# Patient Record
Sex: FEMALE | Race: WHITE | NOT HISPANIC OR LATINO | Employment: OTHER | URBAN - METROPOLITAN AREA
[De-identification: names, ages, dates, MRNs, and addresses within clinical notes are randomized per-mention and may not be internally consistent; named-entity substitution may affect disease eponyms.]

---

## 2017-09-25 ENCOUNTER — HOSPITAL ENCOUNTER (OUTPATIENT)
Dept: PREADMISSION TESTING | Facility: OTHER | Age: 62
Discharge: HOME | End: 2017-09-25
Attending: ORTHOPAEDIC SURGERY
Payer: COMMERCIAL

## 2017-09-25 ENCOUNTER — ANESTHESIA EVENT (OUTPATIENT)
Dept: SURGERY | Facility: OTHER | Age: 62
DRG: 470 | End: 2017-09-25
Payer: COMMERCIAL

## 2017-09-25 VITALS
DIASTOLIC BLOOD PRESSURE: 89 MMHG | TEMPERATURE: 98 F | SYSTOLIC BLOOD PRESSURE: 149 MMHG | WEIGHT: 260 LBS | HEART RATE: 81 BPM | HEIGHT: 69 IN | OXYGEN SATURATION: 94 % | BODY MASS INDEX: 38.51 KG/M2

## 2017-09-25 DIAGNOSIS — Z01.818 PREOPERATIVE TESTING: ICD-10-CM

## 2017-09-25 DIAGNOSIS — M17.12 PRIMARY OSTEOARTHRITIS OF LEFT KNEE: Primary | ICD-10-CM

## 2017-09-25 LAB
ABO + RH BLD: NORMAL
ANION GAP SERPL CALC-SCNC: 10 MMOL/L
BASOPHILS # BLD AUTO: 0.05 K/UL
BASOPHILS NFR BLD: 0.8 %
BILIRUB UR QL STRIP: NEGATIVE
BLD GP AB SCN CELLS X3 SERPL QL: NORMAL
BUN SERPL-MCNC: 12 MG/DL
CALCIUM SERPL-MCNC: 10.6 MG/DL
CHLORIDE SERPL-SCNC: 105 MMOL/L
CLARITY UR: CLEAR
CO2 SERPL-SCNC: 24 MMOL/L
COLOR UR: YELLOW
CREAT SERPL-MCNC: 0.7 MG/DL
DIFFERENTIAL METHOD: ABNORMAL
EOSINOPHIL # BLD AUTO: 0.3 K/UL
EOSINOPHIL NFR BLD: 3.9 %
ERYTHROCYTE [DISTWIDTH] IN BLOOD BY AUTOMATED COUNT: 14.6 %
EST. GFR  (AFRICAN AMERICAN): >60 ML/MIN/1.73 M^2
EST. GFR  (NON AFRICAN AMERICAN): >60 ML/MIN/1.73 M^2
GLUCOSE SERPL-MCNC: 99 MG/DL
GLUCOSE UR QL STRIP: NEGATIVE
HCT VFR BLD AUTO: 44.8 %
HGB BLD-MCNC: 14.8 G/DL
HGB UR QL STRIP: NEGATIVE
KETONES UR QL STRIP: NEGATIVE
LEUKOCYTE ESTERASE UR QL STRIP: NEGATIVE
LYMPHOCYTES # BLD AUTO: 1.3 K/UL
LYMPHOCYTES NFR BLD: 20.5 %
MCH RBC QN AUTO: 29.4 PG
MCHC RBC AUTO-ENTMCNC: 33 G/DL
MCV RBC AUTO: 89 FL
MONOCYTES # BLD AUTO: 0.6 K/UL
MONOCYTES NFR BLD: 9.2 %
NEUTROPHILS # BLD AUTO: 4.2 K/UL
NEUTROPHILS NFR BLD: 64.8 %
NITRITE UR QL STRIP: NEGATIVE
PH UR STRIP: 7 [PH] (ref 5–8)
PLATELET # BLD AUTO: 248 K/UL
PMV BLD AUTO: 11.5 FL
POTASSIUM SERPL-SCNC: 4.3 MMOL/L
PROT UR QL STRIP: NEGATIVE
RBC # BLD AUTO: 5.03 M/UL
SODIUM SERPL-SCNC: 139 MMOL/L
SP GR UR STRIP: 1.01 (ref 1–1.03)
URN SPEC COLLECT METH UR: NORMAL
UROBILINOGEN UR STRIP-ACNC: NEGATIVE EU/DL
WBC # BLD AUTO: 6.49 K/UL

## 2017-09-25 PROCEDURE — 36415 COLL VENOUS BLD VENIPUNCTURE: CPT

## 2017-09-25 PROCEDURE — 85025 COMPLETE CBC W/AUTO DIFF WBC: CPT

## 2017-09-25 PROCEDURE — 81003 URINALYSIS AUTO W/O SCOPE: CPT

## 2017-09-25 PROCEDURE — 86850 RBC ANTIBODY SCREEN: CPT

## 2017-09-25 PROCEDURE — 93005 ELECTROCARDIOGRAM TRACING: CPT

## 2017-09-25 PROCEDURE — 86900 BLOOD TYPING SEROLOGIC ABO: CPT

## 2017-09-25 PROCEDURE — 80048 BASIC METABOLIC PNL TOTAL CA: CPT

## 2017-09-25 PROCEDURE — 93010 ELECTROCARDIOGRAM REPORT: CPT | Mod: ,,, | Performed by: INTERNAL MEDICINE

## 2017-09-25 RX ORDER — LOSARTAN POTASSIUM AND HYDROCHLOROTHIAZIDE 12.5; 5 MG/1; MG/1
1 TABLET ORAL DAILY
COMMUNITY

## 2017-09-25 RX ORDER — ZOLPIDEM TARTRATE 10 MG/1
10 TABLET ORAL NIGHTLY PRN
COMMUNITY
End: 2020-01-17 | Stop reason: CLARIF

## 2017-09-25 RX ORDER — FAMOTIDINE 20 MG/1
20 TABLET, FILM COATED ORAL
Status: CANCELLED | OUTPATIENT
Start: 2017-09-25 | End: 2017-09-25

## 2017-09-25 RX ORDER — SODIUM CHLORIDE, SODIUM LACTATE, POTASSIUM CHLORIDE, CALCIUM CHLORIDE 600; 310; 30; 20 MG/100ML; MG/100ML; MG/100ML; MG/100ML
INJECTION, SOLUTION INTRAVENOUS CONTINUOUS
Status: CANCELLED | OUTPATIENT
Start: 2017-09-25

## 2017-09-25 RX ORDER — LIDOCAINE HYDROCHLORIDE 10 MG/ML
1 INJECTION, SOLUTION EPIDURAL; INFILTRATION; INTRACAUDAL; PERINEURAL ONCE
Status: CANCELLED | OUTPATIENT
Start: 2017-09-25 | End: 2017-09-25

## 2017-09-25 RX ORDER — IBUPROFEN 100 MG/5ML
1000 SUSPENSION, ORAL (FINAL DOSE FORM) ORAL DAILY
COMMUNITY

## 2017-09-25 RX ORDER — EPINEPHRINE 0.22MG
100 AEROSOL WITH ADAPTER (ML) INHALATION DAILY
COMMUNITY
End: 2020-01-17 | Stop reason: CLARIF

## 2017-09-25 RX ORDER — MIDAZOLAM HYDROCHLORIDE 5 MG/ML
4 INJECTION INTRAMUSCULAR; INTRAVENOUS
Status: CANCELLED | OUTPATIENT
Start: 2017-09-25

## 2017-09-25 RX ORDER — OXYCODONE HYDROCHLORIDE 5 MG/1
5 TABLET ORAL ONCE AS NEEDED
Status: CANCELLED | OUTPATIENT
Start: 2017-09-25 | End: 2017-09-25

## 2017-09-25 RX ORDER — BRIMONIDINE TARTRATE 1.5 MG/ML
1 SOLUTION/ DROPS OPHTHALMIC 2 TIMES DAILY
COMMUNITY

## 2017-09-25 NOTE — DISCHARGE INSTRUCTIONS
PRE-ADMIT TESTING -  [phone]    [address]  [place] [facility]          Your surgery has been scheduled at [facility]. We are pleased to have the opportunity to serve you. For Further Information please call [phone].    On the day of surgery please report to the Information Desk on the 1st floor.    · CONTACT YOUR PHYSICIAN'S OFFICE THE DAY PRIOR TO YOUR SURGERY TO OBTAIN YOUR ARRIVAL TIME.     · The evening before surgery do not eat anything after 9 p.m. ( this includes hard candy, chewing gum and mints).  You may only have GATORADE, POWERADE AND WATER  from 9 p.m. until you leave your home.   DO NOT DRINK ANY LIQUIDS ON THE WAY TO THE HOSPITAL.      SPECIAL MEDICATION INSTRUCTIONS: TAKE medications checked off by the Anesthesiologist on your Medication List.    Angiogram Patients: Take medications as instructed by your physician, including aspirin.     Surgery Patients:    If you take ASPIRIN - Your PHYSICIAN/SURGEON will need to inform you IF/OR when you need to stop taking aspirin prior to your surgery.     Do Not take any medications containing IBUPROFEN.  Do Not Wear any make-up or dark nail polish   (especially eye make-up) to surgery. If you come to surgery with makeup on you will be required to remove the makeup or nail polish.    Do not shave your surgical area at least 5 days prior to your surgery. The surgical prep will be performed at the hospital according to Infection Control regulations.    Leave all valuables at home.   Do Not wear any jewelry or watches, including any metal in body piercings.  Contact Lens must be removed before surgery. Either do not wear the contact lens or bring a case and solution for storage.  Please bring a container for eyeglasses or dentures as required.  Bring any paperwork your physician has provided, such as consent forms,  history and physicals, doctor's orders, etc.   Bring comfortable clothes that are loose fitting to wear upon  discharge. Take into consideration the type of surgery being performed.  Maintain your diet as advised per your physician the day prior to surgery.      Adequate rest the night before surgery is advised.   Park in the Parking lot behind the hospital or in the [facility] across the street from the parking lot. Parking is complimentary.  If you will be discharged the same day as your procedure, please arrange for a responsible adult to drive you home or to accompany you if traveling by taxi.   YOU WILL NOT BE PERMITTED TO DRIVE OR TO LEAVE THE HOSPITAL ALONE AFTER SURGERY.   It is strongly recommended that you arrange for someone to remain with you for the first 24 hrs following your surgery.       Thank you for your cooperation.  The Staff of [facility].        Bathing Instructions                                                                 Please shower the evening before and morning of your procedure with    ANTIBACTERIAL SOAP. ( DIAL, etc )  Concentrate on the surgical area   for at least 3 minutes and rinse completely. Dry off as usual.   Do not use any deodorant, powder, body lotions, perfume, after shave or    cologne.

## 2017-09-25 NOTE — ANESTHESIA PREPROCEDURE EVALUATION
09/25/2017  Ana Maria Daniels is a 62 y.o., female.    Anesthesia Evaluation    I have reviewed the Patient Summary Reports.    I have reviewed the Nursing Notes.   I have reviewed the Medications.     Review of Systems  Social:  Non-Smoker, Social Alcohol Use    Hematology/Oncology:  Hematology Normal   Oncology Normal     EENT/Dental:EENT/Dental Normal   Cardiovascular:   Exercise tolerance: good Hypertension, well controlled    Pulmonary:  Pulmonary Normal    Renal/:  Renal/ Normal     Hepatic/GI:  Hepatic/GI Normal    Musculoskeletal:   Arthritis     Neurological:  Neurology Normal    Endocrine:  Endocrine Normal    Dermatological:  Skin Normal    Psych:  Psychiatric Normal           Physical Exam  General:  Morbid Obesity    Airway/Jaw/Neck:  Airway Findings: Mouth Opening: Normal Tongue: Normal  General Airway Assessment: Adult, Good  Mallampati: II  TM Distance: Normal, at least 6 cm  Jaw/Neck Findings:  Neck ROM: Normal ROM      Dental:  Dental Findings: In tact        Mental Status:  Mental Status Findings:  Cooperative, Alert and Oriented         Anesthesia Plan  Type of Anesthesia, risks & benefits discussed:  Anesthesia Type:  spinal  Patient's Preference: Spinal.  Intra-op Monitoring Plan:   Intra-op Monitoring Plan Comments:   Post Op Pain Control Plan:   Post Op Pain Control Plan Comments:   Induction:    Beta Blocker:         Informed Consent: Patient understands risks and agrees with Anesthesia plan.  Questions answered. Anesthesia consent signed with patient.  ASA Score: 3     Day of Surgery Review of History & Physical:    H&P update referred to the surgeon.     Anesthesia Plan Notes: Labs and EkG ordered. Clearance pending.          Ready For Surgery From Anesthesia Perspective.

## 2017-10-03 ENCOUNTER — HOSPITAL ENCOUNTER (INPATIENT)
Facility: OTHER | Age: 62
LOS: 1 days | Discharge: HOME HEALTH | DRG: 470 | End: 2017-10-04
Attending: ORTHOPAEDIC SURGERY | Admitting: ORTHOPAEDIC SURGERY
Payer: COMMERCIAL

## 2017-10-03 ENCOUNTER — ANESTHESIA (OUTPATIENT)
Dept: SURGERY | Facility: OTHER | Age: 62
DRG: 470 | End: 2017-10-03
Payer: COMMERCIAL

## 2017-10-03 DIAGNOSIS — M17.12 PRIMARY LOCALIZED OSTEOARTHRITIS OF LEFT KNEE: ICD-10-CM

## 2017-10-03 DIAGNOSIS — M17.12 LOCALIZED OSTEOARTHRITIS OF LEFT KNEE: Primary | ICD-10-CM

## 2017-10-03 PROCEDURE — 63600175 PHARM REV CODE 636 W HCPCS: Performed by: ORTHOPAEDIC SURGERY

## 2017-10-03 PROCEDURE — 25000003 PHARM REV CODE 250: Performed by: SPECIALIST

## 2017-10-03 PROCEDURE — C1776 JOINT DEVICE (IMPLANTABLE): HCPCS | Performed by: ORTHOPAEDIC SURGERY

## 2017-10-03 PROCEDURE — 25000003 PHARM REV CODE 250: Performed by: INTERNAL MEDICINE

## 2017-10-03 PROCEDURE — 25000003 PHARM REV CODE 250: Performed by: ANESTHESIOLOGY

## 2017-10-03 PROCEDURE — 63600175 PHARM REV CODE 636 W HCPCS: Performed by: SPECIALIST

## 2017-10-03 PROCEDURE — 37000009 HC ANESTHESIA EA ADD 15 MINS: Performed by: ORTHOPAEDIC SURGERY

## 2017-10-03 PROCEDURE — 36000711: Performed by: ORTHOPAEDIC SURGERY

## 2017-10-03 PROCEDURE — 94761 N-INVAS EAR/PLS OXIMETRY MLT: CPT

## 2017-10-03 PROCEDURE — 71000039 HC RECOVERY, EACH ADD'L HOUR: Performed by: ORTHOPAEDIC SURGERY

## 2017-10-03 PROCEDURE — 0SRD0J9 REPLACEMENT OF LEFT KNEE JOINT WITH SYNTHETIC SUBSTITUTE, CEMENTED, OPEN APPROACH: ICD-10-PCS | Performed by: ORTHOPAEDIC SURGERY

## 2017-10-03 PROCEDURE — C9290 INJ, BUPIVACAINE LIPOSOME: HCPCS | Performed by: ORTHOPAEDIC SURGERY

## 2017-10-03 PROCEDURE — 36000710: Performed by: ORTHOPAEDIC SURGERY

## 2017-10-03 PROCEDURE — 25000003 PHARM REV CODE 250: Performed by: ORTHOPAEDIC SURGERY

## 2017-10-03 PROCEDURE — 27201423 OPTIME MED/SURG SUP & DEVICES STERILE SUPPLY: Performed by: ORTHOPAEDIC SURGERY

## 2017-10-03 PROCEDURE — 37000008 HC ANESTHESIA 1ST 15 MINUTES: Performed by: ORTHOPAEDIC SURGERY

## 2017-10-03 PROCEDURE — 71000033 HC RECOVERY, INTIAL HOUR: Performed by: ORTHOPAEDIC SURGERY

## 2017-10-03 PROCEDURE — 25000003 PHARM REV CODE 250: Performed by: NURSE ANESTHETIST, CERTIFIED REGISTERED

## 2017-10-03 PROCEDURE — 11000001 HC ACUTE MED/SURG PRIVATE ROOM

## 2017-10-03 PROCEDURE — C1713 ANCHOR/SCREW BN/BN,TIS/BN: HCPCS | Performed by: ORTHOPAEDIC SURGERY

## 2017-10-03 PROCEDURE — 97116 GAIT TRAINING THERAPY: CPT

## 2017-10-03 PROCEDURE — 63600175 PHARM REV CODE 636 W HCPCS: Performed by: NURSE ANESTHETIST, CERTIFIED REGISTERED

## 2017-10-03 PROCEDURE — 94799 UNLISTED PULMONARY SVC/PX: CPT

## 2017-10-03 PROCEDURE — 97161 PT EVAL LOW COMPLEX 20 MIN: CPT

## 2017-10-03 PROCEDURE — 97110 THERAPEUTIC EXERCISES: CPT

## 2017-10-03 DEVICE — CEMENT BONE RDPQ 40G PDR 20ML: Type: IMPLANTABLE DEVICE | Site: KNEE | Status: FUNCTIONAL

## 2017-10-03 RX ORDER — PROPOFOL 10 MG/ML
VIAL (ML) INTRAVENOUS CONTINUOUS PRN
Status: DISCONTINUED | OUTPATIENT
Start: 2017-10-03 | End: 2017-10-03

## 2017-10-03 RX ORDER — KETOROLAC TROMETHAMINE 30 MG/ML
INJECTION, SOLUTION INTRAMUSCULAR; INTRAVENOUS
Status: DISCONTINUED | OUTPATIENT
Start: 2017-10-03 | End: 2017-10-03 | Stop reason: HOSPADM

## 2017-10-03 RX ORDER — MEPERIDINE HYDROCHLORIDE 50 MG/ML
12.5 INJECTION INTRAMUSCULAR; INTRAVENOUS; SUBCUTANEOUS ONCE AS NEEDED
Status: DISCONTINUED | OUTPATIENT
Start: 2017-10-03 | End: 2017-10-03 | Stop reason: HOSPADM

## 2017-10-03 RX ORDER — OXYCODONE HYDROCHLORIDE 5 MG/1
15 TABLET ORAL EVERY 4 HOURS PRN
Status: DISCONTINUED | OUTPATIENT
Start: 2017-10-03 | End: 2017-10-04 | Stop reason: HOSPADM

## 2017-10-03 RX ORDER — FENTANYL CITRATE 50 UG/ML
INJECTION, SOLUTION INTRAMUSCULAR; INTRAVENOUS
Status: DISCONTINUED | OUTPATIENT
Start: 2017-10-03 | End: 2017-10-03

## 2017-10-03 RX ORDER — SODIUM CHLORIDE, SODIUM LACTATE, POTASSIUM CHLORIDE, CALCIUM CHLORIDE 600; 310; 30; 20 MG/100ML; MG/100ML; MG/100ML; MG/100ML
INJECTION, SOLUTION INTRAVENOUS CONTINUOUS
Status: DISCONTINUED | OUTPATIENT
Start: 2017-10-03 | End: 2017-10-03

## 2017-10-03 RX ORDER — LOSARTAN POTASSIUM 50 MG/1
50 TABLET ORAL DAILY
Status: DISCONTINUED | OUTPATIENT
Start: 2017-10-04 | End: 2017-10-04 | Stop reason: HOSPADM

## 2017-10-03 RX ORDER — ONDANSETRON 2 MG/ML
4 INJECTION INTRAMUSCULAR; INTRAVENOUS EVERY 12 HOURS PRN
Status: DISCONTINUED | OUTPATIENT
Start: 2017-10-03 | End: 2017-10-04 | Stop reason: HOSPADM

## 2017-10-03 RX ORDER — DOCUSATE SODIUM 100 MG/1
100 CAPSULE, LIQUID FILLED ORAL EVERY 12 HOURS
Status: DISCONTINUED | OUTPATIENT
Start: 2017-10-03 | End: 2017-10-04 | Stop reason: HOSPADM

## 2017-10-03 RX ORDER — SODIUM CHLORIDE, SODIUM LACTATE, POTASSIUM CHLORIDE, CALCIUM CHLORIDE 600; 310; 30; 20 MG/100ML; MG/100ML; MG/100ML; MG/100ML
INJECTION, SOLUTION INTRAVENOUS CONTINUOUS
Status: DISCONTINUED | OUTPATIENT
Start: 2017-10-03 | End: 2017-10-04 | Stop reason: HOSPADM

## 2017-10-03 RX ORDER — CEFAZOLIN SODIUM 2 G/50ML
2 SOLUTION INTRAVENOUS
Status: COMPLETED | OUTPATIENT
Start: 2017-10-03 | End: 2017-10-04

## 2017-10-03 RX ORDER — ONDANSETRON 2 MG/ML
4 INJECTION INTRAMUSCULAR; INTRAVENOUS DAILY PRN
Status: DISCONTINUED | OUTPATIENT
Start: 2017-10-03 | End: 2017-10-03 | Stop reason: HOSPADM

## 2017-10-03 RX ORDER — MIDAZOLAM HYDROCHLORIDE 1 MG/ML
INJECTION INTRAMUSCULAR; INTRAVENOUS
Status: DISCONTINUED | OUTPATIENT
Start: 2017-10-03 | End: 2017-10-03

## 2017-10-03 RX ORDER — HYDROMORPHONE HYDROCHLORIDE 2 MG/ML
0.4 INJECTION, SOLUTION INTRAMUSCULAR; INTRAVENOUS; SUBCUTANEOUS EVERY 5 MIN PRN
Status: DISCONTINUED | OUTPATIENT
Start: 2017-10-03 | End: 2017-10-03

## 2017-10-03 RX ORDER — OXYCODONE HYDROCHLORIDE 5 MG/1
5 TABLET ORAL EVERY 4 HOURS PRN
Status: DISCONTINUED | OUTPATIENT
Start: 2017-10-03 | End: 2017-10-04 | Stop reason: HOSPADM

## 2017-10-03 RX ORDER — MEPERIDINE HYDROCHLORIDE 50 MG/ML
12.5 INJECTION INTRAMUSCULAR; INTRAVENOUS; SUBCUTANEOUS ONCE AS NEEDED
Status: DISCONTINUED | OUTPATIENT
Start: 2017-10-03 | End: 2017-10-03

## 2017-10-03 RX ORDER — DIPHENHYDRAMINE HYDROCHLORIDE 50 MG/ML
25 INJECTION INTRAMUSCULAR; INTRAVENOUS EVERY 6 HOURS PRN
Status: DISCONTINUED | OUTPATIENT
Start: 2017-10-03 | End: 2017-10-03

## 2017-10-03 RX ORDER — FAMOTIDINE 20 MG/1
20 TABLET, FILM COATED ORAL
Status: COMPLETED | OUTPATIENT
Start: 2017-10-03 | End: 2017-10-03

## 2017-10-03 RX ORDER — CEFAZOLIN SODIUM 2 G/50ML
2 SOLUTION INTRAVENOUS
Status: COMPLETED | OUTPATIENT
Start: 2017-10-03 | End: 2017-10-03

## 2017-10-03 RX ORDER — LIDOCAINE HYDROCHLORIDE 10 MG/ML
1 INJECTION, SOLUTION EPIDURAL; INFILTRATION; INTRACAUDAL; PERINEURAL ONCE
Status: DISCONTINUED | OUTPATIENT
Start: 2017-10-03 | End: 2017-10-03 | Stop reason: HOSPADM

## 2017-10-03 RX ORDER — FAMOTIDINE 20 MG/1
20 TABLET, FILM COATED ORAL 2 TIMES DAILY
Status: DISCONTINUED | OUTPATIENT
Start: 2017-10-03 | End: 2017-10-04 | Stop reason: HOSPADM

## 2017-10-03 RX ORDER — ENOXAPARIN SODIUM 100 MG/ML
40 INJECTION SUBCUTANEOUS
Status: DISCONTINUED | OUTPATIENT
Start: 2017-10-04 | End: 2017-10-04 | Stop reason: HOSPADM

## 2017-10-03 RX ORDER — OXYCODONE HYDROCHLORIDE 5 MG/1
5 TABLET ORAL
Status: DISCONTINUED | OUTPATIENT
Start: 2017-10-03 | End: 2017-10-03 | Stop reason: HOSPADM

## 2017-10-03 RX ORDER — FENTANYL CITRATE 50 UG/ML
25 INJECTION, SOLUTION INTRAMUSCULAR; INTRAVENOUS EVERY 5 MIN PRN
Status: DISCONTINUED | OUTPATIENT
Start: 2017-10-03 | End: 2017-10-03 | Stop reason: HOSPADM

## 2017-10-03 RX ORDER — HYDROMORPHONE HYDROCHLORIDE 1 MG/ML
1 INJECTION, SOLUTION INTRAMUSCULAR; INTRAVENOUS; SUBCUTANEOUS EVERY 4 HOURS PRN
Status: DISCONTINUED | OUTPATIENT
Start: 2017-10-03 | End: 2017-10-04 | Stop reason: HOSPADM

## 2017-10-03 RX ORDER — ACETAMINOPHEN 10 MG/ML
INJECTION, SOLUTION INTRAVENOUS
Status: DISCONTINUED | OUTPATIENT
Start: 2017-10-03 | End: 2017-10-03

## 2017-10-03 RX ORDER — BACITRACIN 50000 [IU]/1
INJECTION, POWDER, FOR SOLUTION INTRAMUSCULAR
Status: DISCONTINUED | OUTPATIENT
Start: 2017-10-03 | End: 2017-10-03 | Stop reason: HOSPADM

## 2017-10-03 RX ORDER — BUPIVACAINE HYDROCHLORIDE AND EPINEPHRINE 5; 5 MG/ML; UG/ML
INJECTION, SOLUTION EPIDURAL; INTRACAUDAL; PERINEURAL
Status: DISCONTINUED | OUTPATIENT
Start: 2017-10-03 | End: 2017-10-03 | Stop reason: HOSPADM

## 2017-10-03 RX ORDER — HYDROMORPHONE HYDROCHLORIDE 2 MG/ML
0.4 INJECTION, SOLUTION INTRAMUSCULAR; INTRAVENOUS; SUBCUTANEOUS EVERY 5 MIN PRN
Status: DISCONTINUED | OUTPATIENT
Start: 2017-10-03 | End: 2017-10-03 | Stop reason: HOSPADM

## 2017-10-03 RX ORDER — MIDAZOLAM HYDROCHLORIDE 5 MG/ML
4 INJECTION INTRAMUSCULAR; INTRAVENOUS
Status: DISCONTINUED | OUTPATIENT
Start: 2017-10-03 | End: 2017-10-03 | Stop reason: HOSPADM

## 2017-10-03 RX ORDER — SODIUM CHLORIDE 0.9 % (FLUSH) 0.9 %
3 SYRINGE (ML) INJECTION
Status: DISCONTINUED | OUTPATIENT
Start: 2017-10-03 | End: 2017-10-03

## 2017-10-03 RX ORDER — FENTANYL CITRATE 50 UG/ML
25 INJECTION, SOLUTION INTRAMUSCULAR; INTRAVENOUS EVERY 5 MIN PRN
Status: DISCONTINUED | OUTPATIENT
Start: 2017-10-03 | End: 2017-10-03

## 2017-10-03 RX ORDER — BRIMONIDINE TARTRATE 1.5 MG/ML
1 SOLUTION/ DROPS OPHTHALMIC 2 TIMES DAILY
Status: DISCONTINUED | OUTPATIENT
Start: 2017-10-03 | End: 2017-10-04 | Stop reason: HOSPADM

## 2017-10-03 RX ORDER — ONDANSETRON 2 MG/ML
4 INJECTION INTRAMUSCULAR; INTRAVENOUS DAILY PRN
Status: DISCONTINUED | OUTPATIENT
Start: 2017-10-03 | End: 2017-10-03

## 2017-10-03 RX ORDER — MUPIROCIN 20 MG/G
1 OINTMENT TOPICAL 2 TIMES DAILY
Status: DISCONTINUED | OUTPATIENT
Start: 2017-10-03 | End: 2017-10-04 | Stop reason: HOSPADM

## 2017-10-03 RX ORDER — ROPIVACAINE HYDROCHLORIDE 5 MG/ML
INJECTION, SOLUTION EPIDURAL; INFILTRATION; PERINEURAL
Status: DISCONTINUED | OUTPATIENT
Start: 2017-10-03 | End: 2017-10-03

## 2017-10-03 RX ORDER — OXYCODONE HYDROCHLORIDE 5 MG/1
5 TABLET ORAL
Status: DISCONTINUED | OUTPATIENT
Start: 2017-10-03 | End: 2017-10-03

## 2017-10-03 RX ORDER — SODIUM CHLORIDE 0.9 % (FLUSH) 0.9 %
3 SYRINGE (ML) INJECTION
Status: DISCONTINUED | OUTPATIENT
Start: 2017-10-03 | End: 2017-10-04 | Stop reason: HOSPADM

## 2017-10-03 RX ORDER — ZOLPIDEM TARTRATE 5 MG/1
5 TABLET ORAL NIGHTLY PRN
Status: DISCONTINUED | OUTPATIENT
Start: 2017-10-03 | End: 2017-10-04 | Stop reason: HOSPADM

## 2017-10-03 RX ORDER — TRANEXAMIC ACID 100 MG/ML
INJECTION, SOLUTION INTRAVENOUS
Status: DISCONTINUED | OUTPATIENT
Start: 2017-10-03 | End: 2017-10-03 | Stop reason: HOSPADM

## 2017-10-03 RX ORDER — OXYCODONE HYDROCHLORIDE 5 MG/1
5 TABLET ORAL ONCE AS NEEDED
Status: DISCONTINUED | OUTPATIENT
Start: 2017-10-03 | End: 2017-10-03 | Stop reason: HOSPADM

## 2017-10-03 RX ADMIN — MIDAZOLAM HYDROCHLORIDE 4 MG: 5 INJECTION, SOLUTION INTRAMUSCULAR; INTRAVENOUS at 06:10

## 2017-10-03 RX ADMIN — SODIUM CHLORIDE, SODIUM LACTATE, POTASSIUM CHLORIDE, AND CALCIUM CHLORIDE: 600; 310; 30; 20 INJECTION, SOLUTION INTRAVENOUS at 08:10

## 2017-10-03 RX ADMIN — EPHEDRINE SULFATE 10 MG: 50 INJECTION INTRAMUSCULAR; INTRAVENOUS; SUBCUTANEOUS at 08:10

## 2017-10-03 RX ADMIN — FENTANYL CITRATE 100 MCG: 50 INJECTION, SOLUTION INTRAMUSCULAR; INTRAVENOUS at 07:10

## 2017-10-03 RX ADMIN — ROPIVACAINE HYDROCHLORIDE 3 ML: 5 INJECTION, SOLUTION EPIDURAL; INFILTRATION; PERINEURAL at 07:10

## 2017-10-03 RX ADMIN — EPHEDRINE SULFATE 10 MG: 50 INJECTION INTRAMUSCULAR; INTRAVENOUS; SUBCUTANEOUS at 09:10

## 2017-10-03 RX ADMIN — EPHEDRINE SULFATE 10 MG: 50 INJECTION INTRAMUSCULAR; INTRAVENOUS; SUBCUTANEOUS at 07:10

## 2017-10-03 RX ADMIN — SODIUM CHLORIDE, SODIUM LACTATE, POTASSIUM CHLORIDE, AND CALCIUM CHLORIDE: 600; 310; 30; 20 INJECTION, SOLUTION INTRAVENOUS at 07:10

## 2017-10-03 RX ADMIN — FAMOTIDINE 20 MG: 20 TABLET, FILM COATED ORAL at 06:10

## 2017-10-03 RX ADMIN — PROPOFOL 75 MCG/KG/MIN: 10 INJECTION, EMULSION INTRAVENOUS at 07:10

## 2017-10-03 RX ADMIN — FAMOTIDINE 20 MG: 20 TABLET, FILM COATED ORAL at 03:10

## 2017-10-03 RX ADMIN — OXYCODONE HYDROCHLORIDE 5 MG: 5 TABLET ORAL at 04:10

## 2017-10-03 RX ADMIN — BRIMONIDINE TARTRATE 1 DROP: 1.5 SOLUTION OPHTHALMIC at 09:10

## 2017-10-03 RX ADMIN — MIDAZOLAM HYDROCHLORIDE 2 MG: 1 INJECTION, SOLUTION INTRAMUSCULAR; INTRAVENOUS at 09:10

## 2017-10-03 RX ADMIN — ACETAMINOPHEN 1000 MG: 10 INJECTION, SOLUTION INTRAVENOUS at 07:10

## 2017-10-03 RX ADMIN — CEFAZOLIN SODIUM 2 G: 2 SOLUTION INTRAVENOUS at 03:10

## 2017-10-03 RX ADMIN — OXYCODONE HYDROCHLORIDE 5 MG: 5 TABLET ORAL at 09:10

## 2017-10-03 RX ADMIN — SODIUM CHLORIDE, SODIUM LACTATE, POTASSIUM CHLORIDE, AND CALCIUM CHLORIDE: 600; 310; 30; 20 INJECTION, SOLUTION INTRAVENOUS at 06:10

## 2017-10-03 RX ADMIN — MIDAZOLAM HYDROCHLORIDE 2 MG: 1 INJECTION, SOLUTION INTRAMUSCULAR; INTRAVENOUS at 07:10

## 2017-10-03 RX ADMIN — MUPIROCIN 1 G: 20 OINTMENT TOPICAL at 09:10

## 2017-10-03 RX ADMIN — DOCUSATE SODIUM 100 MG: 100 CAPSULE, LIQUID FILLED ORAL at 09:10

## 2017-10-03 RX ADMIN — FAMOTIDINE 20 MG: 20 TABLET, FILM COATED ORAL at 09:10

## 2017-10-03 RX ADMIN — DOCUSATE SODIUM 100 MG: 100 CAPSULE, LIQUID FILLED ORAL at 03:10

## 2017-10-03 RX ADMIN — SODIUM CHLORIDE, SODIUM LACTATE, POTASSIUM CHLORIDE, AND CALCIUM CHLORIDE: 600; 310; 30; 20 INJECTION, SOLUTION INTRAVENOUS at 11:10

## 2017-10-03 RX ADMIN — MUPIROCIN 1 G: 20 OINTMENT TOPICAL at 03:10

## 2017-10-03 RX ADMIN — SODIUM CHLORIDE, SODIUM LACTATE, POTASSIUM CHLORIDE, AND CALCIUM CHLORIDE: 600; 310; 30; 20 INJECTION, SOLUTION INTRAVENOUS at 09:10

## 2017-10-03 RX ADMIN — CEFAZOLIN SODIUM 2 G: 2 SOLUTION INTRAVENOUS at 07:10

## 2017-10-03 RX ADMIN — VANCOMYCIN HYDROCHLORIDE 1 G: 1 INJECTION, POWDER, LYOPHILIZED, FOR SOLUTION INTRAVENOUS at 07:10

## 2017-10-03 NOTE — PT/OT/SLP PROGRESS
Occupational Therapy  Not Seen    Ana Maria Daniels   MRN: [id number]     Patient not seen for Occupational Therapy today due to (Other (Comment)) departmental protocol for elective surgery patients.    Patient with Primary osteoarthritis of left knee [M17.12], s/p Procedure(s):  REPLACEMENT-KNEE-TOTAL 10/3/2017 who will be seen for Occupational Therapy evaluation POD#1.    Irina Bell, OTR/L   10/3/2017

## 2017-10-03 NOTE — ANESTHESIA PROCEDURE NOTES
Spinal    Diagnosis: OA L KNEE  Patient location during procedure: holding area  Start time: 10/3/2017 7:11 AM  Timeout: 10/3/2017 7:11 AM  End time: 10/3/2017 7:14 AM  Staffing  Anesthesiologist: GARY SHELDON  Performed: anesthesiologist   Preanesthetic Checklist  Completed: patient identified, site marked, surgical consent, pre-op evaluation, timeout performed, IV checked, risks and benefits discussed and monitors and equipment checked  Spinal Block  Patient position: sitting  Prep: ChloraPrep  Patient monitoring: heart rate, cardiac monitor and continuous pulse ox  Approach: midline  Location: L4-5  Injection technique: single shot  CSF Fluid: clear free-flowing CSF  Needle  Needle type: pencil-tip   Needle gauge: 25 G  Needle length: 3.5 in  Additional Documentation: incremental injection, negative aspiration for heme and no paresthesia on injection  Needle localization: anatomical landmarks  Assessment  Sensory level: T5   Dermatomal levels determined by alcohol wipe and pinch or prick  Ease of block: easy  Patient's tolerance of the procedure: comfortable throughout block and no complaints  Medications:  Bolus administered: 3 mL of 0.5 ropivacaine  Epinephrine added: none

## 2017-10-03 NOTE — PT/OT/SLP EVAL
Physical Therapy  Evaluation/treatment    Ana Maria Daniels   MRN: [id number]   Admitting Diagnosis: Primary localized osteoarthritis of left knee    PT Received On: 10/03/17  PT Start Time: 1115     PT Stop Time: 1210    PT Total Time (min): 55 min       Billable Minutes:  Evaluation 20, Gait [id number] and Therapeutic Exercise 14    Diagnosis: Primary localized osteoarthritis of left knee      Past Medical History:   Diagnosis Date    Arthritis     Hypertension       Past Surgical History:   Procedure Laterality Date    CHOLECYSTECTOMY      EYE SURGERY Bilateral     IOL       Referring physician: Dr. Cuenca  Date referred to PT: 10/3/17    General Precautions: Standard, fall  Orthopedic Precautions: LLE weight bearing as tolerated   Braces: N/A       Do you have any cultural, spiritual, Protestant conflicts, given your current situation?: no    Patient History:  Lives With: spouse  Living Arrangements: house  Home Accessibility: stairs to enter home, stairs within home  Home Layout: Able to live on 1st floor  Number of Stairs to Enter Home: 4  Stair Railings at Home: outside, present at both sides  Transportation Available: car, family or friend will provide  Living Environment Comment: pt lives with spouse in 2 story house with 4 SHAUN and B HR wide apart.  pt has 1st floor bed and bath with shower stall with built in seat and comfort height toilet.  has no DME.  I PTA, drives, does cooking and shopping, retired,  works but took off this week.to assist pt   Equipment Currently Used at Home: none  DME owned (not currently used): none    Previous Level of Function:  Ambulation Skills: independent  Transfer Skills: independent  ADL Skills: independent  Work/Leisure Activity: independent    Subjective:  Communicated with nursing prior to session.    Chief Complaint: knee pain  Patient goals: PLOF     Pain/Comfort  Pain Rating 1: 3/10  Location - Side 1: Right  Location - Orientation 1: generalized  Location 1:  knee  Pain Addressed 1: Pre-medicate for activity, Reposition, Distraction, Cessation of Activity  Pain Rating Post-Intervention 1: 3/10      Objective:   Patient found with: day catheter, peripheral IV, Polar ice, SCD     Cognitive Exam:  Oriented to: Person, Place, Time and Situation    Follows Commands/attention: Follows two-step commands and Follows multistep  commands  Communication: clear/fluent  Safety awareness/insight to disability: intact    Physical Exam:  Postural examination/scapula alignment: Rounded shoulder    Skin integrity: R LE in surgical wrap  Edema: RLE    Sensation:   Intact light touch in BLE    Lower Extremity Range of Motion:  Right Lower Extremity: Deficits: decreased in knee , ankle WFL  Left Lower Extremity: WFL    Lower Extremity Strength:  Right Lower Extremity: Deficits: fair quad contraction, unable to SLR without A, ankle WFL  Left Lower Extremity: WFL    Gross motor coordination: decreased on R as compared to L    Functional Mobility:  Bed Mobility:  Supine to Sit: Minimum Assistance, With leg lift, With side rail  Sit to Supine:  (left up in chair)    Transfers:  Sit <> Stand Assistance: Contact Guard Assistance  Sit <> Stand Assistive Device: Rolling Walker    Gait:   Gait Distance: 150' initially pt with decreased knee flex and circumducting leg.  cues for heel toe reciprocal gait   Assistance 1: Contact Guard Assistance  Gait Assistive Device: Rolling walker  Gait Pattern: reciprocal  Gait Deviation(s): decreased chris, decreased step length, decreased stride length, decreased swing-to-stance ratio    Stairs:  Not assessed    Balance:   Static Sit: GOOD: Takes MODERATE challenges from all directions  Dynamic Sit: GOOD: Maintains balance through MODERATE excursions of active trunk movement  Static Stand: FAIR+: Takes MINIMAL challenges from all directions  Dynamic stand: FAIR: Needs CONTACT GUARD during gait    Therapeutic Activities and Exercises:  PT eval.  Instructed pt  and  in TKR ex-handout given    Pt performed 1 set of 10 reps of RLE  1. Glut sets  2. Quad sets  3. Ankle pumps  4. Hip abd/add  5. SLR  6. Knee flexion (heel slides)  7. Short arc quads  Pt required verbal cues, tactile cues and visual cues for technique in order to complete.   .     AM-PAC 6 CLICK MOBILITY  How much help from another person does this patient currently need?   1 = Unable, Total/Dependent Assistance  2 = A lot, Maximum/Moderate Assistance  3 = A little, Minimum/Contact Guard/Supervision  4 = None, Modified Wisdom/Independent    Turning over in bed (including adjusting bedclothes, sheets and blankets)?: 3  Sitting down on and standing up from a chair with arms (e.g., wheelchair, bedside commode, etc.): 3  Moving from lying on back to sitting on the side of the bed?: 3  Moving to and from a bed to a chair (including a wheelchair)?: 3  Need to walk in hospital room?: 3  Climbing 3-5 steps with a railing?: 3  Total Score: 18     AM-PAC Raw Score CMS G-Code Modifier Level of Impairment Assistance   6 % Total / Unable   7 - 9 CM 80 - 100% Maximal Assist   10 - 14 CL 60 - 80% Moderate Assist   15 - 19 CK 40 - 60% Moderate Assist   20 - 22 CJ 20 - 40% Minimal Assist   23 CI 1-20% SBA / CGA   24 CH 0% Independent/ Mod I     Patient left up in chair with all lines intact, call button in reach, nursing notified and spouse present.    Assessment:   Ana Maria Daniels is a 62 y.o. female with a medical diagnosis of Primary localized osteoarthritis of left knee and presents with s/p TKR.  Pt was I PTA.  Benefit from therapy to return to I functional level.       Rehab identified problem list/impairments: Rehab identified problem list/impairments: weakness, impaired functional mobilty, gait instability, impaired endurance, impaired balance, decreased ROM, decreased lower extremity function, decreased safety awareness, pain    Rehab potential is good.    Activity tolerance: Good    Discharge  recommendations: Discharge Facility/Level Of Care Needs: home with home health     Barriers to discharge: Barriers to Discharge: Inaccessible home environment    Equipment recommendations: Equipment Needed After Discharge: walker, rolling, 3-in-1 commode     GOALS:    Physical Therapy Goals        Problem: Physical Therapy Goal    Goal Priority Disciplines Outcome Goal Variances Interventions   Physical Therapy Goal     PT/OT, PT Ongoing (interventions implemented as appropriate)     Description:  Goals to be met by: 10/8/17     Patient will increase functional independence with mobility by performin. Supine to sit with supervision.   2. Sit to supine with supervision.   3. Sit<>stand transfer with supervision using rolling walker.   4. Gait > 150 feet with SBA using rolling walker.   5. Ascend/descend 4 stairs with R handrails with CGA   6.  Perform 1 set of 10 of LE TKR ex with HO and assist as needed                       PLAN:    Patient to be seen BID to address the above listed problems via gait training, therapeutic activities, therapeutic exercises  Plan of Care expires: 17  Plan of Care reviewed with: patient, spouse          Phuong CLINTON Mina, PT  10/03/2017

## 2017-10-03 NOTE — PLAN OF CARE
SW met with pt at bedside complete discharge assessment. Pt has shower bench and needs rolling walker and bedside commode and HH upon discharge.     10/03/17 9846   Discharge Assessment   Confirmed/corrected address and phone number on facesheet? Yes   Assessment information obtained from? Patient   Communicated expected length of stay with patient/caregiver no   Prior to hospitilization cognitive status: Alert/Oriented   Prior to hospitalization functional status: Independent   Current cognitive status: Alert/Oriented   Current Functional Status: Assistive Equipment;Needs Assistance   Lives With spouse  (son, 33 yo handicap son)   Able to Return to Prior Arrangements yes   Is patient able to care for self after discharge? Unable to determine at this time (comments)   Who are your caregiver(s) and their phone number(s)? (Lawerence, spouse, [phone])   Patient's perception of discharge disposition home health   Readmission Within The Last 30 Days no previous admission in last 30 days   Patient currently being followed by outpatient case management? No   Patient currently receives any other outside agency services? No   Equipment Currently Used at Home none   Do you have any problems affording any of your prescribed medications? No   Is the patient taking medications as prescribed? yes   Does the patient have transportation home? Yes   Transportation Available family or friend will provide   Does the patient receive services at the [facility]mad[facility] Clinic? No   Discharge Plan A Home Health   Patient/Family In Agreement With Plan yes

## 2017-10-03 NOTE — PLAN OF CARE
Problem: Physical Therapy Goal  Goal: Physical Therapy Goal  Goals to be met by: 10/8/17     Patient will increase functional independence with mobility by performin. Supine to sit with supervision.   2. Sit to supine with supervision.   3. Sit<>stand transfer with supervision using rolling walker.   4. Gait > 150 feet with SBA using rolling walker.   5. Ascend/descend 4 stairs with R handrails with CGA   6.  Perform 1 set of 10 of LE TKR ex with HO and assist as needed     Outcome: Ongoing (interventions implemented as appropriate)  PT eval.  Instructed pt and  in TKR ex-handout given  Performed 1 set of 10.  amb with RW for 150' with cueing for gait pattern.  Anticipate home tomorrow  REC:  Home with HH  DME:  RW and 3 in 1 commode

## 2017-10-03 NOTE — TRANSFER OF CARE
"Anesthesia Transfer of Care Note    Patient: Ana Maria Daniels    Procedure(s) Performed: Procedure(s) (LRB):  REPLACEMENT-KNEE-TOTAL (Left)    Patient location: PACU    Anesthesia Type: spinal    Transport from OR: Transported from OR on 2-3 L/min O2 by NC with adequate spontaneous ventilation    Post pain: adequate analgesia    Post assessment: no apparent anesthetic complications and tolerated procedure well    Post vital signs: stable    Level of consciousness: awake, alert and oriented    Nausea/Vomiting: no nausea/vomiting    Complications: none    Transfer of care protocol was followed      Last vitals:   Visit Vitals  BP (!) 145/82 (BP Location: Left arm, Patient Position: Lying)   Pulse 71   Temp 36.7 °C (98.1 °F) (Oral)   Resp 18   Ht 5' 9" (1.753 m)   Wt 117.9 kg (260 lb)   SpO2 95%   Breastfeeding? No   BMI 38.40 kg/m²     "

## 2017-10-03 NOTE — DISCHARGE INSTRUCTIONS
Knee Athroscopy Discharge Instructions    1) Pain: After surgery your knee will be sore. The knee will likely have been injected with a numbing medicine (Exparel) prior to completion of surgery for pain control. This is indicated on a green bracelet that you will continue to wear for 4 days after surgery. You will   also get a prescription for pain control before you leave the hospital. Ice and elevation will assist with pain control.    a) Apply ice as much as possible for the first 72 hours. After 72 hours, apply ice for 20minutes after therapy,after exercising or whenever experiencing pain. Avoid direct skin contact with ice to prevent frostbit.          b) Elevate the affected leg with the pillow the length of the leg higher than your heart to assist with swelling and pain.  2) Incision Care:  a) Some drainage from the incision in the first 72 hours is normal. If drainage is excessive,remove bandage, clean with mild soap and water, pat dry, cover with sterile gauze and secure with tape. Notify physician about excessive drainage. Staples will be removed 14-21 days after surgery   3) Activity:  a) Perform exercises 2-3 x day.  b) You may shower 48 hours after surgery providing the dressing is waterproof. No tub or hot tub usage. Support help is mandatory during showering. If the dressing becomes wet, replace with a new dressing.   c) Wear thigh high kameron hose stockings for 3 weeks after surgery .You may remove stockings for 1- 2 hours during the day only.  d) If your physician orders the CPM machine you are to use it for 2 hours in the am and 2 hours in the pm.Increase the flexion 5 degrees each session if tolerated. This is not to replace your exercise program.  4) For lifetime after your replacement surgery, you may need antibiotic coverage before dental or minor surgical procedures.  5) Possible Complications: Call Surgeon  a) Infection: Report these signs and symptoms to your surgeon.  i) Unexpected redness  around incision   ii) Persistent drainage from wound after 72 hours.  iii) Temperature greater than 101 degrees F: Can be treated with Tylenol. Do not go to the emergency room or urgent care center, call your surgeon.   iv) Additional swelling  v) Pain not controlled with current pain medication  b) Blood Clot: Report theses signs and symptoms to your surgeon  i) Unusual pain  ii) Red or discolored skin  iii) Swelling in the leg  iv) Unusual warm skin

## 2017-10-03 NOTE — ANESTHESIA POSTPROCEDURE EVALUATION
"Anesthesia Post Evaluation    Patient: Ana Maria Daniels    Procedure(s) Performed: Procedure(s) (LRB):  REPLACEMENT-KNEE-TOTAL (Left)    Final Anesthesia Type: spinal  Patient location during evaluation: PACU  Patient participation: Yes- Able to Participate  Level of consciousness: awake and alert and oriented  Post-procedure vital signs: reviewed and stable  Pain management: adequate  Airway patency: patent  PONV status at discharge: No PONV  Anesthetic complications: no      Cardiovascular status: blood pressure returned to baseline and hemodynamically stable  Respiratory status: unassisted, spontaneous ventilation and nasal cannula  Hydration status: euvolemic  Follow-up not needed.        Visit Vitals  /68   Pulse 69   Temp 36.4 °C (97.6 °F) (Oral)   Resp 18   Ht 5' 9" (1.753 m)   Wt 117.9 kg (260 lb)   SpO2 (!) 94%   Breastfeeding? No   BMI 38.40 kg/m²       Pain/Marisa Score: Pain Assessment Performed: Yes (10/3/2017  9:18 AM)  Presence of Pain: denies (10/3/2017  9:18 AM)  Pain Rating Prior to Med Admin: 2 (10/3/2017  9:59 AM)  Pain Rating Post Med Admin: 0 (10/3/2017 10:20 AM)  Marisa Score: 10 (10/3/2017 10:35 AM)      "

## 2017-10-03 NOTE — OP NOTE
DATE OF PROCEDURE:  10/03/2017    SURGEON:  Galindo Cuenca M.D.    PREOPERATIVE DIAGNOSIS:  Left knee tricompartmental osteoarthritis with valgus   alignment.    POSTOPERATIVE DIAGNOSIS:  Left knee tricompartmental osteoarthritis with valgus   alignment.    PROCEDURES:  Left total knee arthroplasty with Lima components.    ANESTHESIA:  Spinal.    COMPLICATIONS:  None.    BLOOD LOSS:  None.    DRAINS:  None.    INDICATIONS:  The patient is a 62-year-old female with a history of bilateral   knee osteoarthritis.  She had bone-on-bone on both knees and was unable to walk   more than a half block without severe pain.  She elected to perform a left total   knee arthroplasty first.  She understood the options of treatment with the   risks, benefits and limitations of operative versus nonoperative treatment and   gave informed consent for operative intervention.    FINDINGS:  Exam under anesthesia of left knee revealed valgus alignment to the   knee.  Surgical findings showed significant tricompartmental osteoarthritis with   bone-on-bone in the lateral compartment, weightbearing compartments and   patellofemoral joint region.  After placement of the prosthetic components, we   used a cemented 5 femoral component along with a 3 cemented tibial component.    We used an 11 mm polyethylene insert.  We used a 26 all-poly patella, which was   cemented into place.  After adequate set of time for the cement, the knee had   full range of motion, excellent alignment and stability in both AP and lateral   planes, excellent patellar tracking, no other abnormalities noted.  We did   perform a small lateral release for optimal patellar tracking.    PROCEDURE IN DETAIL:  After operative consents were obtained, the patient was   brought to the Operating Room, laid in a supine position.  After spinal   anesthesia was administered via the Anesthesia Department, the patient was   placed on a well-padded operating table.  All bony  prominences were well padded.    The patient did receive preoperative antibiotics prior to tourniquet   elevation.  Tourniquet was placed high on the left thigh and then the left leg   was prepped and draped in the usual sterile fashion.  A post was placed on the   operating table to allow left knee flexion during the procedure.  After adequate   prepping and draping of the left leg, the leg was exsanguinated and tourniquet   inflated to 350 mmHg.  Standard midline mini approach midline incision was made   over the left knee, dissection carried down to the extensor mechanism.  A medial   parapatellar arthrotomy was then made.  The retropatellar tendon fat pad was   excised as well as the medial and lateral meniscal cartilages.  Gaining access   into the intramedullary canal of the femur, the distal femoral alignment jig was   applied with a 3-degree valgus cut reference.  The distal femoral cut was then   made.  The sizing jig was applied to the femur to accept a 5 cutting block.  The   cutting block was applied and the anterior posterior femoral cuts were made,   followed by the anterior posterior chamfer cuts.  Proximal tibia was then   exposed and using the extramedullary tibial alignment guide in the appropriate   varus, valgus and posterior slope, proximal tibia was resected salvaging the   PCL.  We did reference off the low lateral side.  The sizing jig was applied to   the tibia to accept a size 3 tray and the cruciform stem was cut in the   appropriate rotation.  A trial reduction was then carried out with a 5 femur, 3   tibia, 10 and 11 spacers, the 11 was more stable.  There was excellent alignment   and stability in both AP and lateral planes.  The patella was then resurfaced   removing 6-7 mm of bone and cartilage and a 26 3-prong patella was drilled   followed by the trial recreating the thickness of the patella.  The patella   tracked well after a small lateral release was performed with the    electrocautery.  The trial components were then removed and the bone edges were   irrigated with pulse jet lavage sterile saline with antibiotic solution.  The   Striped Sail tissue ablation instrument was also used for tissue hemostasis at   that time.  The permanent components were opened on the back table.  Two bags of   Palacos cement were mixed and then applied to the tibia and the size 3 tibial   component was cemented into place, followed by the cemented 5 femoral component.    The 11 mm polyethylene insert was placed along with a locking mechanism and   then the knee brought out to full extension further compressing the cement.  The   26 all-poly patella was cemented into place, held with a patellar clamp.  After   adequate set of time for the cement, the knee had full range of motion,   excellent alignment and stability in both AP and lateral planes, excellent   patellar tracking.  The knee was again irrigated with pulse jet lavage sterile   saline with antibiotic solution.  The Exparel cocktail was then injected into   the capsule and soft tissue for local anesthesia and then 3 vials of tranexamic   acid were drizzled into the capsule and soft tissue for tissue hemostasis.  The   VMO was closed with interrupted 0 Ethibond suture x3 and then the rest of the   medial arthrotomy was closed with a running #2 Quill suture.  Subcu was closed   in layers with 0 and 2-0 Vicryl suture and the skin closed with metal staples.    Sterile dressings were applied consisting of Xeroform gauze, 4 x 4s and cast   padding along with a Breg Polar Care unit and an Ace bandage from toe to groin.    Tourniquet deflated after 82 minutes tourniquet time.  The patient tolerated   the procedure well, was sent to Recovery Room in stable condition.  Needle and   sponge counts were correct.  Blood loss was minimal.  No blood given.  No drains   placed.      TPF/IN  dd: 10/03/2017 09:38:10 (CDT)  td: 10/03/2017 10:33:01 (CDT)  Doc ID    #[id number]  Job ID #[id number]    CC:

## 2017-10-03 NOTE — BRIEF OP NOTE
[facility]  Brief Operative Note    SUMMARY     Surgery Date: 10/3/2017     Surgeon(s) and Role:     * Galindo Cuenca MD - Primary    Assisting Surgeon: None    Pre-op Diagnosis:  Primary osteoarthritis of left knee [M17.12]    Post-op Diagnosis:  Post-Op Diagnosis Codes:     * Primary osteoarthritis of left knee [M17.12]    Procedure(s) (LRB):  REPLACEMENT-KNEE-TOTAL (Left)    Anesthesia:Spinal    Description of Procedure: Left TKA    Description of the findings of the procedure: Left TKA see op note # [id number]    Estimated Blood Loss: * No values recorded between 10/3/2017  7:44 AM and 10/3/2017  9:17 AM *         Specimens:   Specimen (12h ago through future)    None

## 2017-10-03 NOTE — PLAN OF CARE
Problem: Patient Care Overview  Goal: Plan of Care Review  Outcome: Ongoing (interventions implemented as appropriate)  Pt on RA; SPO2 WNL. Pt was instructed and educated on IS with family at bedside. No changes made. Will continue to monitor.

## 2017-10-03 NOTE — PLAN OF CARE
10/03/17 1038   ED Admissions Case Approval   ED Admissions Case Approval CM Approved  (IP )

## 2017-10-03 NOTE — CONSULTS
"Internal Medicine  Consult Note    Admit Date: 10/3/2017   LOS: 0 days     SUBJECTIVE:     Follow-up For:  Medical Management    HPI:  63yo F with HTN admitted for L TKA.   at the bedside.  Pain controlled, tolerating po, and has already worked with PT.    Review of Systems:   Constitutional: no fevers, chills, appetite or weight changes  Respiratory: no cough or shorness of breath   Cardiovascular: no chest pain or palpitations   Gastrointestinal: no nausea or vomiting, no abdominal pain or change in bowel habits   Genitourinary: no hematuria or dysuria   Musculoskeletal: no arthralgias or myalgias   Integumentary: no rashes or lesions  Neurological: no seizures or tremors    Past Medical History:   Diagnosis Date    Arthritis     Hypertension      Past Surgical History:   Procedure Laterality Date    CHOLECYSTECTOMY      EYE SURGERY Bilateral     IOL     History reviewed. No pertinent family history.  Social History   Substance Use Topics    Smoking status: Never Smoker    Smokeless tobacco: Never Used    Alcohol use 1.2 oz/week     1 Glasses of wine, 1 Shots of liquor per week      Comment: weekends       OBJECTIVE:     Vital Signs Range (Last 24H):  /64 (Patient Position: Lying)   Pulse 71   Temp 97.2 °F (36.2 °C) (Oral)   Resp 18   Ht 5' 9" (1.753 m)   Wt 117.9 kg (260 lb)   SpO2 98%   Breastfeeding? No   BMI 38.40 kg/m²     Temp:  [97.2 °F (36.2 °C)-98.1 °F (36.7 °C)]   Pulse:  [68-75]   Resp:  [18]   BP: (128-145)/(59-82)   SpO2:  [94 %-100 %]     I & O (Last 24H):  Intake/Output Summary (Last 24 hours) at 10/03/17 1343  Last data filed at 10/03/17 1030   Gross per 24 hour   Intake             2800 ml   Output             1550 ml   Net             1250 ml       Physical Exam:  General appearance: Well developed, well nourished  Eyes:  Conjunctivae/corneas clear. EOMI  Lungs: Normal respiratory effort,   clear to auscultation bilaterally   Heart: Regular rate and rhythm, S1, S2 " normal, no murmur, rub or carli.  Abdomen: Soft, non-tender, obese; bowel sounds normal; no masses,  no organomegaly  Extremities: No edema.  L knee bandages c/d/i.  Skin: Skin color, texture, turgor normal. No rashes or lesions  Neurologic: Normal strength and tone. No focal numbness or weakness     Laboratory Data:  No results for input(s): WBC, RBC, HGB, HCT, PLT, MCV, MCH, MCHC in the last 24 hours.    BMP: No results for input(s): GLU, NA, K, CL, CO2, BUN, CREATININE, CALCIUM, MG in the last 168 hours.    Invalid input(s):  PHOS  Lab Results   Component Value Date    CALCIUM 10.6 (H) 09/25/2017       Medications:  Medication list was reviewed and changes noted under Assessment/Plan.    Diagnostic Results: Reviewed.      ASSESSMENT/PLAN:     S/p L TKA  - Post-op management, pain control, and DVT prophylaxis per Dr. Cuenca.    HTN (I 10)  - Resume Losartan in AM.  Hold parameters ordered.  - Hold HCTZ.    Glaucoma (H40.9)  - Resume eye drops.    PCP: Dr. Olga Gil    Thank you for the consult.  We will continue to follow along with you.    Kane Bah MD  Nephrology

## 2017-10-04 VITALS
SYSTOLIC BLOOD PRESSURE: 98 MMHG | HEART RATE: 95 BPM | WEIGHT: 260 LBS | HEIGHT: 69 IN | TEMPERATURE: 96 F | BODY MASS INDEX: 38.51 KG/M2 | OXYGEN SATURATION: 99 % | RESPIRATION RATE: 16 BRPM | DIASTOLIC BLOOD PRESSURE: 56 MMHG

## 2017-10-04 LAB
ERYTHROCYTE [DISTWIDTH] IN BLOOD BY AUTOMATED COUNT: 14.1 %
HCT VFR BLD AUTO: 38 %
HGB BLD-MCNC: 12.4 G/DL
MCH RBC QN AUTO: 29.2 PG
MCHC RBC AUTO-ENTMCNC: 32.6 G/DL
MCV RBC AUTO: 90 FL
PLATELET # BLD AUTO: 214 K/UL
PMV BLD AUTO: 11.2 FL
RBC # BLD AUTO: 4.24 M/UL
WBC # BLD AUTO: 7.29 K/UL

## 2017-10-04 PROCEDURE — 97535 SELF CARE MNGMENT TRAINING: CPT

## 2017-10-04 PROCEDURE — 94761 N-INVAS EAR/PLS OXIMETRY MLT: CPT

## 2017-10-04 PROCEDURE — 97116 GAIT TRAINING THERAPY: CPT

## 2017-10-04 PROCEDURE — 97530 THERAPEUTIC ACTIVITIES: CPT

## 2017-10-04 PROCEDURE — 97165 OT EVAL LOW COMPLEX 30 MIN: CPT

## 2017-10-04 PROCEDURE — 25000003 PHARM REV CODE 250: Performed by: INTERNAL MEDICINE

## 2017-10-04 PROCEDURE — 25000003 PHARM REV CODE 250: Performed by: ORTHOPAEDIC SURGERY

## 2017-10-04 PROCEDURE — 85027 COMPLETE CBC AUTOMATED: CPT

## 2017-10-04 PROCEDURE — 36415 COLL VENOUS BLD VENIPUNCTURE: CPT

## 2017-10-04 PROCEDURE — 97110 THERAPEUTIC EXERCISES: CPT

## 2017-10-04 PROCEDURE — 63600175 PHARM REV CODE 636 W HCPCS: Performed by: ORTHOPAEDIC SURGERY

## 2017-10-04 RX ADMIN — OXYCODONE HYDROCHLORIDE 5 MG: 5 TABLET ORAL at 06:10

## 2017-10-04 RX ADMIN — BRIMONIDINE TARTRATE 1 DROP: 1.5 SOLUTION OPHTHALMIC at 09:10

## 2017-10-04 RX ADMIN — FAMOTIDINE 20 MG: 20 TABLET, FILM COATED ORAL at 09:10

## 2017-10-04 RX ADMIN — DOCUSATE SODIUM 100 MG: 100 CAPSULE, LIQUID FILLED ORAL at 09:10

## 2017-10-04 RX ADMIN — ENOXAPARIN SODIUM 40 MG: 100 INJECTION SUBCUTANEOUS at 06:10

## 2017-10-04 RX ADMIN — ONDANSETRON 4 MG: 2 INJECTION INTRAMUSCULAR; INTRAVENOUS at 06:10

## 2017-10-04 RX ADMIN — OXYCODONE HYDROCHLORIDE 5 MG: 5 TABLET ORAL at 01:10

## 2017-10-04 RX ADMIN — LOSARTAN POTASSIUM 50 MG: 50 TABLET, FILM COATED ORAL at 09:10

## 2017-10-04 RX ADMIN — MUPIROCIN 1 G: 20 OINTMENT TOPICAL at 09:10

## 2017-10-04 RX ADMIN — OXYCODONE HYDROCHLORIDE 5 MG: 5 TABLET ORAL at 12:10

## 2017-10-04 RX ADMIN — CEFAZOLIN SODIUM 2 G: 2 SOLUTION INTRAVENOUS at 06:10

## 2017-10-04 RX ADMIN — CEFAZOLIN SODIUM 2 G: 2 SOLUTION INTRAVENOUS at 12:10

## 2017-10-04 NOTE — PLAN OF CARE
Problem: Patient Care Overview  Goal: Plan of Care Review  Outcome: Ongoing (interventions implemented as appropriate)  Patient in no apparent distress. Sat's  97 % on room air. IS done . Will continue to monitor.

## 2017-10-04 NOTE — PROGRESS NOTES
"Nephrology  Progress Note    Admit Date: 10/3/2017   LOS: 1 day     SUBJECTIVE:     Follow-up For:  Primary localized osteoarthritis of left knee    Interval History:     Uneventful night except knee pain.  Doing well with therapy.  Denies CP/SOB/Calf pain.      Review of Systems:  Constitutional: No fever or chills  Respiratory: No cough or shortness of breath  Cardiovascular: No chest pain or palpitations  Gastrointestinal: No nausea or vomiting  Neurological: No confusion or weakness    OBJECTIVE:     Vital Signs Range (Last 24H):  BP (!) 143/71 (BP Location: Right arm, Patient Position: Lying)   Pulse 85   Temp 98.7 °F (37.1 °C) (Oral)   Resp 18   Ht 5' 9" (1.753 m)   Wt 117.9 kg (260 lb)   SpO2 96%   Breastfeeding? No   BMI 38.40 kg/m²     Temp:  [96.5 °F (35.8 °C)-98.7 °F (37.1 °C)]   Pulse:  [68-88]   Resp:  [16-20]   BP: (124-143)/(59-78)   SpO2:  [94 %-100 %]     I & O (Last 24H):  Intake/Output Summary (Last 24 hours) at 10/04/17 0920  Last data filed at 10/04/17 0430   Gross per 24 hour   Intake             1150 ml   Output             3050 ml   Net            -1900 ml       Physical Exam:  General appearance: Well developed, well nourished  Eyes:  Conjunctivae/corneas clear. PERRL.  Lungs: Normal respiratory effort,   clear to auscultation bilaterally   Heart: Regular rate and rhythm, S1, S2 normal, no murmur, rub or carli.  Abdomen: Soft, non-tender non-distended; bowel sounds normal; no masses,  no organomegaly  Extremities: No cyanosis or clubbing. No edema.    Skin: Skin color, texture, turgor normal. No rashes or lesions  Neurologic: Normal strength and tone. No focal numbness or weakness   Left knee:  CDI    Laboratory Data:    Recent Labs  Lab 10/04/17  0555   WBC 7.29   RBC 4.24   HGB 12.4   HCT 38.0      MCV 90   MCH 29.2   MCHC 32.6       BMP: No results for input(s): GLU, NA, K, CL, CO2, BUN, CREATININE, CALCIUM, MG in the last 168 hours.    Invalid input(s):  PHOS  Lab Results "   Component Value Date    CALCIUM 10.6 (H) 09/25/2017               Medications:  Medication list was reviewed and changes noted under Assessment/Plan.    Diagnostic Results:        ASSESSMENT/PLAN:          S/p L TKA  - Post-op management, pain control, and DVT prophylaxis per Dr. Cuenca.     HTN (I 10)  - Resumed Losartan.  Hold parameters ordered.  - Hold HCTZ and restart at ME.     Glaucoma (H40.9)  - Resumed eye drops.    PCP: Dr. Olga Gil      Ok to ME today.

## 2017-10-04 NOTE — PLAN OF CARE
Problem: Physical Therapy Goal  Goal: Physical Therapy Goal  Goals to be met by: 10/8/17     Patient will increase functional independence with mobility by performin. Supine to sit with supervision.   2. Sit to supine with supervision.   3. Sit<>stand transfer with supervision using rolling walker.   4. Gait > 150 feet with SBA using rolling walker.   5. Ascend/descend 4 stairs with R handrails with CGA   6.  Perform 1 set of 10 of LE TKR ex with HO and assist as needed      Pt progressing towards goals today, inc amb dist, amb'd 400' w/ RW and CGA/SBA (only req'd CGA today 2/2 dec BP :98/56, though no c/'o's lightheadedness). Pt asc/desc 4 steps w/ 2 rails and CGA. HHPT

## 2017-10-04 NOTE — PLAN OF CARE
Problem: Occupational Therapy Goal  Goal: Occupational Therapy Goal  No goals    Outcome: Outcome(s) achieved Date Met: 10/04/17  OT evaluation completed with treatment provided.  Recommend Home Health PT/OT at discharge.  DME: 3-in-1 JOSE gregorio LOTR 10/4/2017

## 2017-10-04 NOTE — PT/OT/SLP DISCHARGE
Physical Therapy Discharge Summary    Ana Maria Daniels  MRN: [id number]   Primary localized osteoarthritis of left knee   Patient Discharged from acute Physical Therapy on 10/4/17.  Please refer to prior PT noted date on 10/4/17 for functional status.     Assessment:   Goals partially met.  GOALS:    Physical Therapy Goals        Problem: Physical Therapy Goal    Goal Priority Disciplines Outcome Goal Variances Interventions   Physical Therapy Goal     PT/OT, PT Ongoing (interventions implemented as appropriate)     Description:  Goals to be met by: 10/8/17     Patient will increase functional independence with mobility by performin. Supine to sit with supervision.   2. Sit to supine with supervision.   3. Sit<>stand transfer with supervision using rolling walker.   4. Gait > 150 feet with SBA using rolling walker. -met 10/4/17  5. Ascend/descend 4 stairs with R handrails with CGA  Met 10/4/17  6.  Perform 1 set of 10 of LE TKR ex with HO and assist as needed met 10/4/17                    Reasons for Discontinuation of Therapy Services  Transfer to alternate level of care. and hospital dc      Plan:  Patient Discharged to: Home with Home Health Service.

## 2017-10-04 NOTE — PROGRESS NOTES
Patient postoperative day #1 from left total knee arthroplasty.  Patient is doing well with minimal complaints.  Left knee dressing intact.  She did ambulate 180 feet yesterday.  Hemoglobin 12 hematocrit 38.  Plan: Continue physical therapy for gait training and range of motion.           Dressing change today with thigh-high CORI hose.           Low-dose aspirin regimen discussed for DVT prophylaxis.           Discharge home this afternoon if doing well with therapy.

## 2017-10-04 NOTE — PLAN OF CARE
Problem: Patient Care Overview  Goal: Plan of Care Review  Outcome: Ongoing (interventions implemented as appropriate)  No significant events overnight. Free from falls, injury, and skin breakdown. VSS and afebrile. Surgical dressing CDI. Pain well controlled with low-dose po meds. No c/o n/v. IV fluids, SCDs, and polar care maintained. Mast catheter in place, for removal this am. Neurovascular checks WDL. CPM to be delivered to patient today. Purposeful rounding performed every one to two hours throughout shift and safety precautions in place and ongoing.

## 2017-10-04 NOTE — PT/OT/SLP PROGRESS
Physical Therapy  Treatment    Ana Maria Daniels   MRN: [id number]   Admitting Diagnosis: Primary localized osteoarthritis of left knee    PT Received On: 10/04/17  PT Start Time: 1030     PT Stop Time: 1116    PT Total Time (min): 46 min       Billable Minutes:  Gait [id number] and Therapeutic Activity 16    Treatment Type: Treatment  PT/PTA: PTA     PTA Visit Number: 1       General Precautions: Standard, fall  Orthopedic Precautions: LLE weight bearing as tolerated   Braces: N/A    Do you have any cultural, spiritual, Oriental orthodox conflicts, given your current situation?: no    Subjective:  Communicated with ns prior to session.  Pt agreeable to tx session, reports she was nauseated earlier this morning, but feeling a little better now.    Pain/Comfort  Pain Rating 1: 4/10  Location - Side 1: Left  Location - Orientation 1: generalized  Location 1: knee  Pain Addressed 1: Pre-medicate for activity, Reposition, Distraction  Pain Rating Post-Intervention 1: 6/10 (w/ amb)    Objective:   Patient found with: peripheral IV, Polar ice, CPM    Functional Mobility:  Bed Mobility:   Supine to Sit: Minimum Assistance, Contact Guard Assistance, With leg lift (from flat bed)  Sit to Supine: Minimum Assistance, Contact Guard Assistance, With leg lift (from flat bed)    Transfers:  Sit <> Stand Assistance: Contact Guard Assistance, Stand By Assistance  Sit <> Stand Assistive Device: Rolling Walker    Gait:   Gait Distance: 400' w/ RW and CGA/SBA (CGA 2/2 low BP 98/56, though no c/o's of lightheadedness from pt)  Assistance 1: Contact Guard Assistance, Stand by Assistance  Gait Assistive Device: Rolling walker  Gait Pattern: reciprocal  Gait Deviation(s): decreased chris, decreased step length, decreased stride length, decreased toe-to-floor clearance, decreased weight-shifting ability (dec knee flexion noted w/ amb.)    Stairs:  Pt ascended/descend 4 stair(s) with No Assistive Device with bilateral and handrails with Contact Guard  Assistance.     Balance:   Static Sit: GOOD: Takes MODERATE challenges from all directions  Dynamic Sit: GOOD: Maintains balance through MODERATE excursions of active trunk movement  Static Stand: FAIR+: Takes MINIMAL challenges from all directions  Dynamic stand: FAIR: Needs CONTACT GUARD during gait     Therapeutic Activities and Exercises:  Pt and  inst'd in CPM set up and use. Pt using CPM 0-20*, pt very anxious about increasing flexion, (MD orders say 0-60* first day), able to slowly inc to 50* on CPM today.     AM-PAC 6 CLICK MOBILITY  How much help from another person does this patient currently need?   1 = Unable, Total/Dependent Assistance  2 = A lot, Maximum/Moderate Assistance  3 = A little, Minimum/Contact Guard/Supervision  4 = None, Modified [place]/Independent    Turning over in bed (including adjusting bedclothes, sheets and blankets)?: 3  Sitting down on and standing up from a chair with arms (e.g., wheelchair, bedside commode, etc.): 3  Moving from lying on back to sitting on the side of the bed?: 3  Moving to and from a bed to a chair (including a wheelchair)?: 3  Need to walk in hospital room?: 3  Climbing 3-5 steps with a railing?: 3  Total Score: 18    AM-PAC Raw Score CMS G-Code Modifier Level of Impairment Assistance   6 % Total / Unable   7 - 9 CM 80 - 100% Maximal Assist   10 - 14 CL 60 - 80% Moderate Assist   15 - 19 CK 40 - 60% Moderate Assist   20 - 22 CJ 20 - 40% Minimal Assist   23 CI 1-20% SBA / CGA   24 CH 0% Independent/ Mod I     Patient left HOB elevated with all lines intact, call button in reach, ns notified and  present.    Assessment:  Ana Maria Daniels is a 62 y.o. female with a medical diagnosis of Primary localized osteoarthritis of left knee and presents with inc amb dist today, though limited slightly by dec BP this morning.    Rehab identified problem list/impairments: Rehab identified problem list/impairments: weakness, impaired endurance,  impaired self care skills, impaired functional mobilty, gait instability, impaired balance, pain, edema, decreased ROM, impaired skin, decreased lower extremity function    Rehab potential is good.    Activity tolerance: Excellent    Discharge recommendations: Discharge Facility/Level Of Care Needs: home health PT, home health OT     Barriers to discharge: Barriers to Discharge: Inaccessible home environment    Equipment recommendations: Equipment Needed After Discharge: 3-in-1 commode, walker, rolling     GOALS:    Physical Therapy Goals        Problem: Physical Therapy Goal    Goal Priority Disciplines Outcome Goal Variances Interventions   Physical Therapy Goal     PT/OT, PT Ongoing (interventions implemented as appropriate)     Description:  Goals to be met by: 10/8/17     Patient will increase functional independence with mobility by performin. Supine to sit with supervision.   2. Sit to supine with supervision.   3. Sit<>stand transfer with supervision using rolling walker.   4. Gait > 150 feet with SBA using rolling walker.   5. Ascend/descend 4 stairs with R handrails with CGA   6.  Perform 1 set of 10 of LE TKR ex with HO and assist as needed                       PLAN:    Patient to be seen BID  to address the above listed problems via gait training, therapeutic activities, therapeutic exercises, neuromuscular re-education  Plan of Care expires: 17  Plan of Care reviewed with: patient, spouse         Nini Thanh, PTA  10/04/2017

## 2017-10-04 NOTE — PT/OT/SLP EVAL
Occupational Therapy  Evaluation/Treatment/Discharge    Ana Maria Daniels   MRN: [id number]   Admitting Diagnosis: Primary localized osteoarthritis of left knee,S/p Left TKA       OT Date of Treatment: 10/04/17   OT Start Time: 0918  OT Stop Time: 0959  OT Total Time (min): 41 min    Billable Minutes:  Evaluation 18  Self Care/Home Management 23    Diagnosis: Primary localized osteoarthritis of left knee   S/p Left TKA    Past Medical History:   Diagnosis Date    Arthritis     Hypertension       Past Surgical History:   Procedure Laterality Date    CHOLECYSTECTOMY      EYE SURGERY Bilateral     IOL       Referring physician: Galindo Cuenca  Date referred to OT: 10/4/17    General Precautions: Standard, fall  Orthopedic Precautions: LLE weight bearing as tolerated  Braces: N/A    Do you have any cultural, spiritual, Adventist conflicts, given your current situation?: none     Patient History:  Living Environment  Lives With: sibling(s)  Living Arrangements: house (single story house)  Home Accessibility: stairs to enter home  Home Layout: Able to live on 1st floor  Number of Stairs to Enter Home: 4  Stair Railings at Home: outside, present at both sides  Transportation Available: family or friend will provide  Living Environment Comment: The pat;[facility]  is available to assist with her care at discharge  Equipment Currently Used at Home: none    Prior level of function:   Bed Mobility/Transfers: independent  Grooming: independent  Bathing: independent  Upper Body Dressing: independent  Lower Body Dressing: independent  Toileting: independent  Home Management Skills: independent  Driving License: Yes  Mode of Transportation: Car  Leisure and Hobbies: grandchildren, bowling  IADL Comments: ambulitory without AD, Independent ADLs and IADLs     Dominant hand: right    Subjective:  The patient was found supine in bed agreeable to OT treatment.    Chief Complaint: none  Patient/Family stated goals: regain  PLOF    Pain/Comfort  Pain Rating 1: 4/10  Location - Orientation 1: generalized  Location 1: knee  Pain Addressed 1: Pre-medicate for activity, Nurse notified, Reposition  Pain Rating Post-Intervention 1: 8/10 (with nausea)    Objective:  Patient found with: peripheral IV, Polar ice, FCD    Cognitive Exam:  Oriented to: Person, Place, Time and Situation  Follows Commands/attention: Follows multi step  commands  Communication: clear/fluent  Memory:  No Deficits noted  Safety awareness/insight to disability: intact  Coping skills/emotional control: Appropriate to situation    Visual/perceptual:  Impaired  acuity and glasses    Physical Exam:  Postural examination/scapula alignment: No postural abnormalities identified  Skin integrity: Visible skin intact  Edema: Moderate Left knee    Sensation:   Intact for light touch both hands    Upper Extremity Range of Motion:  Right Upper Extremity: WNL  Left Upper Extremity: WNL    Upper Extremity Strength:  Right Upper Extremity: WNL  Left Upper Extremity: WNL   Strength: fair both hands     Fine motor coordination:   Intact both hands    Gross motor coordination: sitting balance WFL, impaired LE balance-giat, good safety awareness, fair endurance with self-care    Functional Mobility:  Bed Mobility:  Scooting/Bridging: Stand by Assistance  Supine to Sit: Minimum Assistance  Sit to Supine: Minimum Assistance    Transfers:   CGA sit><stand with RW  Min A toilet transfer with RW    Functional Ambulation: ambulated bed>bathroom>chair>bed with RW SBA (short steps with difficulty advancing LLE (irrcumducts Left hip to clear foot from floor)    Activities of Daily Living:  Feeding Level of Assistance: Activity did not occur  UE Dressing Level of Assistance: Modified independent (doff/hospital gown as robe sitting EOB)  LE Dressing Level of Assistance: Stand by assistance (doff/don socks, don briefs with instruction seated EOB)  Grooming Position: Standing at sink (RW)  Grooming  "Level of Assistance: Stand by assistance (wash hands with instruction for RW use)  Toileting Where Assessed: Toilet  Toileting Level of Assistance: Supervision    Balance:   Static Sit: GOOD+: Takes MAXIMAL challenges from all directions.    Dynamic Sit: GOOD+: Maintains balance through MAXIMAL excursions of active trunk motion  Static Stand: FAIR: Maintains without assist but unable to take challenges  Dynamic stand: FAIR+: Needs CLOSE SUPERVISION during gait and is able to right self with minor LOB with RW    Therapeutic Activities and Exercises:  Home safety and set-up for self-care, pain management post-TKA surgery, transfer training, gait training, RW use and safety, RW use with standing self-care tasks, integrating LE exercise into ADLs, BSC use at home, precautions and activity recommendations post-TKA surgery    AM-PAC 6 CLICK ADL  How much help from another person does this patient currently need?  1 = Unable, Total/Dependent Assistance  2 = A lot, Maximum/Moderate Assistance  3 = A little, Minimum/Contact Guard/Supervision  4 = None, Modified [place]/Independent    Putting on and taking off regular lower body clothing? : 3  Bathing (including washing, rinsing, drying)?: 3  Toileting, which includes using toilet, bedpan, or urinal? : 3  Putting on and taking off regular upper body clothing?: 4  Taking care of personal grooming such as brushing teeth?: 3  Eating meals?: 4  Total Score: 20    AM-PAC Raw Score CMS "G-Code Modifier Level of Impairment Assistance   6 % Total / Unable   7 - 9 CM 80 - 100% Maximal Assist   10-14 CL 60 - 80% Moderate Assist   15 - 19 CK 40 - 60% Moderate Assist   20 - 22 CJ 20 - 40% Minimal Assist   23 CI 1-20% SBA / CGA   24 CH 0% Independent/ Mod I       Patient left supine with all lines intact, call button in reach, bed alarm off, nurse  notified pt status and need for nausea medication and patient's  and CPM provider in need of fitting the CPM. " present    Assessment:  Ana Maria Daniels is a 62 y.o. female with a medical diagnosis of Primary localized osteoarthritis of left knee,S/p Left TKA  and presents with  weakness, impaired endurance, impaired self care skills, impaired functional mobility, gait instability, impaired balance, pain, decreased ROM, edema, decreased lower extremity function and nausea effecting performance during the session.  OT treatment was provided to provide instruction in self-care, safety, and home activity recommendations and restrictions.  Home Health OT recommended for high level ADLs, with hospital based OT treatment needs met at the time of the evaluation.  .  Rehab identified problem list/impairments: Rehab identified problem list/impairments: weakness, impaired endurance, impaired self care skills, impaired functional mobility, gait instability, impaired balance, pain, decreased ROM, edema, decreased lower extremity function, nausea    Rehab potential is good.    Activity tolerance: Good    Discharge recommendations: Discharge Facility/Level Of Care Needs: home health OT, home health PT     Barriers to discharge: Barriers to Discharge: Inaccessible home environment    Equipment recommendations: 3-in-1 commode, walker, rolling     GOALS:    Occupational Therapy Goals     Not on file          Multidisciplinary Problems (Resolved)        Problem: Occupational Therapy Goal    Goal Priority Disciplines Outcome Interventions   Occupational Therapy Goal   (Resolved)     OT, PT/OT Outcome(s) achieved    Description:  No goals                      PLAN:  No OT treatment scheduled.    Plan of Care reviewed with: patient, spouse         CHANA Espinosa  10/04/2017

## 2017-10-04 NOTE — PT/OT/SLP PROGRESS
Physical Therapy  Treatment    Ana Maria Daniels   MRN: [id number]   Admitting Diagnosis: Primary localized osteoarthritis of left knee    PT Received On: 10/04/17  PT Start Time: 1316     PT Stop Time: 1341    PT Total Time (min): 25 min       Billable Minutes:  Gait [id number] and Therapeutic Exercise 10    Treatment Type: Treatment  PT/PTA: PTA     PTA Visit Number: 1       General Precautions: Standard, fall  Orthopedic Precautions: LLE weight bearing as tolerated   Braces: N/A    Do you have any cultural, spiritual, Pentecostalism conflicts, given your current situation?: no    Subjective:  Communicated with ns prior to session.  Pt agreeable to tx session, no co's.    Pain/Comfort  Pain Rating 1: 3/10  Location - Side 1: Left  Location - Orientation 1: generalized  Location 1: knee  Pain Addressed 1: Pre-medicate for activity, Reposition, Distraction  Pain Rating Post-Intervention 1: 3/10    Objective:   Patient found with: peripheral IV, Polar ice    Functional Mobility:  Bed Mobility:   Supine to Sit: Contact Guard Assistance, Minimum Assistance, With leg lift  Sit to Supine: Contact Guard Assistance, Minimum Assistance, With leg lift    Transfers:  Sit <> Stand Assistance: Stand By Assistance  Sit <> Stand Assistive Device: Rolling Walker    Gait:   Gait Distance: 200' w/ RW and SBA  Assistance 1: Stand by Assistance  Gait Assistive Device: Rolling walker  Gait Pattern: reciprocal  Gait Deviation(s): decreased chris, decreased step length, decreased stride length, decreased weight-shifting ability, decreased toe-to-floor clearance (dec knee flexion)    Stairs:  perf'd in AM    Balance:   Static Sit: GOOD: Takes MODERATE challenges from all directions  Dynamic Sit: GOOD: Maintains balance through MODERATE excursions of active trunk movement  Static Stand: FAIR+: Takes MINIMAL challenges from all directions  Dynamic stand: FAIR+: Needs CLOSE SUPERVISION during gait and is able to right self with minor LOB      Therapeutic Activities and Exercises:  Pt perf'd seated LAQ's and AAROM knee flexion ex's x 10 ea.      AM-PAC 6 CLICK MOBILITY  How much help from another person does this patient currently need?   1 = Unable, Total/Dependent Assistance  2 = A lot, Maximum/Moderate Assistance  3 = A little, Minimum/Contact Guard/Supervision  4 = None, Modified [place]/Independent    Turning over in bed (including adjusting bedclothes, sheets and blankets)?: 3  Sitting down on and standing up from a chair with arms (e.g., wheelchair, bedside commode, etc.): 3  Moving from lying on back to sitting on the side of the bed?: 3  Moving to and from a bed to a chair (including a wheelchair)?: 3  Need to walk in hospital room?: 3  Climbing 3-5 steps with a railing?: 3  Total Score: 18    AM-PAC Raw Score CMS G-Code Modifier Level of Impairment Assistance   6 % Total / Unable   7 - 9 CM 80 - 100% Maximal Assist   10 - 14 CL 60 - 80% Moderate Assist   15 - 19 CK 40 - 60% Moderate Assist   20 - 22 CJ 20 - 40% Minimal Assist   23 CI 1-20% SBA / CGA   24 CH 0% Independent/ Mod I     Patient left supine with all lines intact, call button in reach and ns present.    Assessment:  Ana Maria Daniels is a 62 y.o. female with a medical diagnosis of Primary localized osteoarthritis of left knee and presents with good mobility today, pt doing better w/ knee flexion/toe off during amb., though needs lots of TC's and VC's..Pt w/ ~60* AAROM seated knee flexion today.    Rehab identified problem list/impairments: Rehab identified problem list/impairments: weakness, impaired endurance, impaired self care skills, impaired functional mobilty, gait instability, impaired balance, pain, edema, decreased ROM, impaired skin, decreased lower extremity function    Rehab potential is good.    Activity tolerance: Good    Discharge recommendations: Discharge Facility/Level Of Care Needs: home health PT, home health OT     Barriers to discharge: Barriers  to Discharge: Inaccessible home environment    Equipment recommendations: Equipment Needed After Discharge: 3-in-1 commode, walker, rolling     GOALS:    Physical Therapy Goals        Problem: Physical Therapy Goal    Goal Priority Disciplines Outcome Goal Variances Interventions   Physical Therapy Goal     PT/OT, PT Ongoing (interventions implemented as appropriate)     Description:  Goals to be met by: 10/8/17     Patient will increase functional independence with mobility by performin. Supine to sit with supervision.   2. Sit to supine with supervision.   3. Sit<>stand transfer with supervision using rolling walker.   4. Gait > 150 feet with SBA using rolling walker.   5. Ascend/descend 4 stairs with R handrails with CGA   6.  Perform 1 set of 10 of LE TKR ex with HO and assist as needed                       PLAN:    Patient to be seen BID  to address the above listed problems via gait training, therapeutic activities, therapeutic exercises, neuromuscular re-education  Plan of Care expires: 17  Plan of Care reviewed with: patient, spouse         Nini Thanh, PTA  10/04/2017

## 2017-10-09 NOTE — DISCHARGE SUMMARY
DATE OF ADMISSION:  10/03/2017.    DATE OF DISCHARGE:  10/04/2017.    DIAGNOSIS:  Left knee tricompartmental osteoarthritis.    PROCEDURE:  On 10/03/2017, left total knee arthroplasty.    HISTORY:  The patient is a 62-year-old female with a history of left knee pain.    She had locking, catching, swelling.  She had significant bone-on-bone   osteoarthritis mainly in the lateral compartment and patellofemoral joint   region.  She was admitted for total knee arthroplasty.    HOSPITAL COURSE:  The patient underwent left total knee arthroplasty on the   above noted date.  Postoperatively, she did well.  She had a dressing change on   postoperative day #1.  She progressed in physical therapy with range of motion   and gait training.  She was discharged home in good condition to have a regular   diet.  Med reconciliation form was completed.  She will follow up Dr. Cuenca in   2 weeks.  Home health arranged for gait training and range of motion to left   knee.      LINDY/GUILHERME  dd: 10/09/2017 09:14:20 (CDT)  td: 10/09/2017 09:23:29 (CDT)  Doc ID   #[id number]  Job ID #[id number]    CC:

## 2017-12-26 ENCOUNTER — PATIENT MESSAGE (OUTPATIENT)
Dept: ADMINISTRATIVE | Facility: OTHER | Age: 62
End: 2017-12-26

## 2019-03-22 NOTE — PT/OT/SLP PROGRESS
Physical Therapy  Treatment    Ana Maria Daniels   MRN: [id number]   Admitting Diagnosis: Primary localized osteoarthritis of left knee    PT Received On: 10/03/17  PT Start Time: 1336     PT Stop Time: 1402    PT Total Time (min): 26 min       Billable Minutes:  Gait [id number] and Therapeutic Exercise 10    Treatment Type: Treatment  PT/PTA: PTA     PTA Visit Number: 0       General Precautions: Standard, fall  Orthopedic Precautions: LLE weight bearing as tolerated   Braces: N/A    Do you have any cultural, spiritual, Voodoo conflicts, given your current situation?: no    Subjective:  Communicated with ns prior to session.  Pt agreeable to tx., requesting to go back to bed following.    Pain/Comfort  Pain Rating 1: 0/10  Location - Side 1: Left  Location - Orientation 1: generalized  Location 1: knee  Pain Addressed 1: Pre-medicate for activity, Reposition, Distraction  Pain Rating Post-Intervention 1: 4/10 (w/ amb and ex's)    Objective:   Patient found with: day catheter, peripheral IV, Polar ice    Functional Mobility:  Bed Mobility:   Supine to Sit: Minimum Assistance, With leg lift, With side rail  Sit to Supine: Contact Guard Assistance, Minimum Assistance, With leg lift    Transfers:  Sit <> Stand Assistance: Contact Guard Assistance  Sit <> Stand Assistive Device: Rolling Walker    Gait:   Gait Distance: 180' w/ RW and CGA/SBA, cueing for knee flexion and heel strike  Assistance 1: Contact Guard Assistance, Stand by Assistance  Gait Assistive Device: Rolling walker  Gait Pattern: reciprocal  Gait Deviation(s): decreased chris, decreased step length, decreased stride length, decreased toe-to-floor clearance, decreased weight-shifting ability    Stairs:  N/a    Balance:   Static Sit: GOOD: Takes MODERATE challenges from all directions  Dynamic Sit: GOOD: Maintains balance through MODERATE excursions of active trunk movement  Static Stand: FAIR: Maintains without assist but unable to take  challenges  Dynamic stand: FAIR: Needs CONTACT GUARD during gait     Therapeutic Activities and Exercises:  Pt perf'd supine ex's of AP's, QS, hip abd/add, SLR's, and heelslides x 10 reps ea.     AM-PAC 6 CLICK MOBILITY  How much help from another person does this patient currently need?   1 = Unable, Total/Dependent Assistance  2 = A lot, Maximum/Moderate Assistance  3 = A little, Minimum/Contact Guard/Supervision  4 = None, Modified [place]/Independent    Turning over in bed (including adjusting bedclothes, sheets and blankets)?: 3  Sitting down on and standing up from a chair with arms (e.g., wheelchair, bedside commode, etc.): 3  Moving from lying on back to sitting on the side of the bed?: 3  Moving to and from a bed to a chair (including a wheelchair)?: 3  Need to walk in hospital room?: 3  Climbing 3-5 steps with a railing?: 3  Total Score: 18    AM-PAC Raw Score CMS G-Code Modifier Level of Impairment Assistance   6 % Total / Unable   7 - 9 CM 80 - 100% Maximal Assist   10 - 14 CL 60 - 80% Moderate Assist   15 - 19 CK 40 - 60% Moderate Assist   20 - 22 CJ 20 - 40% Minimal Assist   23 CI 1-20% SBA / CGA   24 CH 0% Independent/ Mod I     Patient left HOB elevated with all lines intact, call button in reach, ns notified and  present.    Assessment:  Ana Maria Daniels is a 62 y.o. female with a medical diagnosis of Primary localized osteoarthritis of left knee and presents with good mobility today, only min c/o's knee pain. NO LOB or SOB noted.    Rehab identified problem list/impairments: Rehab identified problem list/impairments: weakness, impaired functional mobilty, gait instability, impaired self care skills, impaired balance, decreased lower extremity function, pain, edema, decreased ROM, impaired skin    Rehab potential is excellent.    Activity tolerance: Excellent    Discharge recommendations: Discharge Facility/Level Of Care Needs: home with home health     Barriers to discharge:  Barriers to Discharge: Inaccessible home environment    Equipment recommendations: Equipment Needed After Discharge: 3-in-1 commode, walker, rolling     GOALS:    Physical Therapy Goals        Problem: Physical Therapy Goal    Goal Priority Disciplines Outcome Goal Variances Interventions   Physical Therapy Goal     PT/OT, PT Ongoing (interventions implemented as appropriate)     Description:  Goals to be met by: 10/8/17     Patient will increase functional independence with mobility by performin. Supine to sit with supervision.   2. Sit to supine with supervision.   3. Sit<>stand transfer with supervision using rolling walker.   4. Gait > 150 feet with SBA using rolling walker.   5. Ascend/descend 4 stairs with R handrails with CGA   6.  Perform 1 set of 10 of LE TKR ex with HO and assist as needed                       PLAN:    Patient to be seen BID  to address the above listed problems via gait training, therapeutic activities, therapeutic exercises  Plan of Care expires: 17  Plan of Care reviewed with: patient, spouse         Nini Law, PTA  10/03/2017     no

## 2020-01-16 NOTE — NURSING
Total Joint Replacement Questionnaire     Ana Maria Daniels participated in Joint Class Jan 8    1. Have you ever had a Joint Replacement?   [x]Yes  [] No    2. How many stairs/steps do you have to enter your home? 4  When going up, on what side is the railing?  [] Left  [] Right  [x] Bilateral  [] None    3. Do you own any Durable Medical Equipment?   [] Rolling Walker  [x] Standard Walker  [] Rollator  [x] Cane  [] Crutches  [x] Bed Side Commode  [] Hip Kit  [] Tub Transfer Bench  [] Shower Chair     4. Have you used a Home Health Company before?   [x] Yes  [] No  If Yes, Name of Company: ?  Would you like to use this company again?   [x] Yes  [] No  [x] Would you like to use your physician's preference?  [x] Yes  [] No    5. Have you arranged for someone to help you, for at least for 3 days, when you are discharged from the hospital?  [x] Yes  [] No  If Yes, Name(s) of person assisting: Felipe Ag  1/16/2020

## 2020-01-17 ENCOUNTER — ANESTHESIA EVENT (OUTPATIENT)
Dept: SURGERY | Facility: OTHER | Age: 65
End: 2020-01-17
Payer: COMMERCIAL

## 2020-01-17 ENCOUNTER — HOSPITAL ENCOUNTER (OUTPATIENT)
Dept: PREADMISSION TESTING | Facility: OTHER | Age: 65
Discharge: HOME | End: 2020-01-17
Attending: ORTHOPAEDIC SURGERY
Payer: COMMERCIAL

## 2020-01-17 VITALS
HEIGHT: 68 IN | BODY MASS INDEX: 40.92 KG/M2 | HEART RATE: 89 BPM | OXYGEN SATURATION: 95 % | DIASTOLIC BLOOD PRESSURE: 68 MMHG | SYSTOLIC BLOOD PRESSURE: 146 MMHG | TEMPERATURE: 98 F | WEIGHT: 270 LBS

## 2020-01-17 DIAGNOSIS — M17.11 PRIMARY LOCALIZED OSTEOARTHROSIS OF THE KNEE, RIGHT: Primary | ICD-10-CM

## 2020-01-17 LAB
ABO + RH BLD: NORMAL
BLD GP AB SCN CELLS X3 SERPL QL: NORMAL
BLOOD GROUP ANTIBODIES SERPL: NORMAL

## 2020-01-17 PROCEDURE — 86870 RBC ANTIBODY IDENTIFICATION: CPT

## 2020-01-17 PROCEDURE — 86901 BLOOD TYPING SEROLOGIC RH(D): CPT

## 2020-01-17 PROCEDURE — 36415 COLL VENOUS BLD VENIPUNCTURE: CPT

## 2020-01-17 PROCEDURE — 86905 BLOOD TYPING RBC ANTIGENS: CPT | Mod: 91

## 2020-01-17 RX ORDER — PREGABALIN 75 MG/1
75 CAPSULE ORAL
Status: CANCELLED | OUTPATIENT
Start: 2020-01-17 | End: 2020-01-17

## 2020-01-17 RX ORDER — FAMOTIDINE 20 MG/1
20 TABLET, FILM COATED ORAL
Status: CANCELLED | OUTPATIENT
Start: 2020-01-17 | End: 2020-01-17

## 2020-01-17 RX ORDER — CELECOXIB 200 MG/1
400 CAPSULE ORAL
Status: CANCELLED | OUTPATIENT
Start: 2020-01-17 | End: 2020-01-17

## 2020-01-17 RX ORDER — LIDOCAINE HYDROCHLORIDE 10 MG/ML
0.5 INJECTION, SOLUTION EPIDURAL; INFILTRATION; INTRACAUDAL; PERINEURAL ONCE
Status: CANCELLED | OUTPATIENT
Start: 2020-01-17 | End: 2020-01-17

## 2020-01-17 RX ORDER — ACETAMINOPHEN 500 MG
1000 TABLET ORAL
Status: CANCELLED | OUTPATIENT
Start: 2020-01-17 | End: 2020-01-17

## 2020-01-17 RX ORDER — SODIUM CHLORIDE, SODIUM LACTATE, POTASSIUM CHLORIDE, CALCIUM CHLORIDE 600; 310; 30; 20 MG/100ML; MG/100ML; MG/100ML; MG/100ML
INJECTION, SOLUTION INTRAVENOUS CONTINUOUS
Status: CANCELLED | OUTPATIENT
Start: 2020-01-17

## 2020-01-17 NOTE — ANESTHESIA PREPROCEDURE EVALUATION
01/17/2020  Ana Maria Daniels is a 64 y.o., female.    Anesthesia Evaluation    I have reviewed the Patient Summary Reports.    I have reviewed the Nursing Notes.   I have reviewed the Medications.     Review of Systems  Anesthesia Hx:  No problems with previous Anesthesia    Hematology/Oncology:  Hematology Normal   Oncology Normal     EENT/Dental:EENT/Dental Normal   Cardiovascular:   Exercise tolerance: good Hypertension, well controlled    Pulmonary:  Pulmonary Normal    Renal/:  Renal/ Normal     Hepatic/GI:   PUD,    Musculoskeletal:   Arthritis     Neurological:  Neurology Normal    Endocrine:  Endocrine Normal    Dermatological:  Skin Normal    Psych:  Psychiatric Normal           Physical Exam  General:  Morbid Obesity    Airway/Jaw/Neck:  Airway Findings: Mouth Opening: Normal Tongue: Normal  General Airway Assessment: Adult, Good  Mallampati: I  TM Distance: Normal, at least 6 cm  Jaw/Neck Findings:  Neck ROM: Normal ROM      Dental:  Dental Findings: In tact        Mental Status:  Mental Status Findings:  Cooperative, Alert and Oriented         Anesthesia Plan  Type of Anesthesia, risks & benefits discussed:  Anesthesia Type:  spinal  Patient's Preference:   Intra-op Monitoring Plan: standard ASA monitors  Intra-op Monitoring Plan Comments:   Post Op Pain Control Plan: multimodal analgesia and per primary service following discharge from PACU  Post Op Pain Control Plan Comments:   Induction:    Beta Blocker:         Informed Consent: Patient understands risks and agrees with Anesthesia plan.  Questions answered. Anesthesia consent signed with patient.  ASA Score: 3     Day of Surgery Review of History & Physical:    H&P update referred to the surgeon.         Ready For Surgery From Anesthesia Perspective.

## 2020-01-17 NOTE — DISCHARGE INSTRUCTIONS
PRE-ADMIT TESTING -  [phone]    [address]  [place] [facility]          Your surgery has been scheduled at [facility]. We are pleased to have the opportunity to serve you. For Further Information please call [phone].    On the day of surgery please report to the Information Desk on the 1st floor.    · CONTACT YOUR PHYSICIAN'S OFFICE THE DAY PRIOR TO YOUR SURGERY TO OBTAIN YOUR ARRIVAL TIME.     · The evening before surgery do not eat anything after 9 p.m. ( this includes hard candy, chewing gum and mints).  You may only have GATORADE, POWERADE AND WATER  from 9 p.m. until you leave your home.   DO NOT DRINK ANY LIQUIDS ON THE WAY TO THE HOSPITAL.      SPECIAL MEDICATION INSTRUCTIONS: TAKE medications checked off by the Anesthesiologist on your Medication List.    Angiogram Patients: Take medications as instructed by your physician, including aspirin.     Surgery Patients:    If you take ASPIRIN - Your PHYSICIAN/SURGEON will need to inform you IF/OR when you need to stop taking aspirin prior to your surgery.     Do Not take any medications containing IBUPROFEN.  Do Not Wear any make-up or dark nail polish   (especially eye make-up) to surgery. If you come to surgery with makeup on you will be required to remove the makeup or nail polish.    Do not shave your surgical area at least 5 days prior to your surgery. The surgical prep will be performed at the hospital according to Infection Control regulations.    Leave all valuables at home.   Do Not wear any jewelry or watches, including any metal in body piercings. Jewelry must be removed prior to coming to the hospital.  There is a possibility that rings that are unable to be removed may be cut off if they are on the surgical extremity.    Contact Lens must be removed before surgery. Either do not wear the contact lens or bring a case and solution for storage.  Please bring a container for eyeglasses or dentures as required.  Bring  any paperwork your physician has provided, such as consent forms,  history and physicals, doctor's orders, etc.   Bring comfortable clothes that are loose fitting to wear upon discharge. Take into consideration the type of surgery being performed.  Maintain your diet as advised per your physician the day prior to surgery.      Adequate rest the night before surgery is advised.   Park in the Parking lot behind the hospital or in the [facility] across the street from the parking lot. Parking is complimentary.  If you will be discharged the same day as your procedure, please arrange for a responsible adult to drive you home or to accompany you if traveling by taxi.   YOU WILL NOT BE PERMITTED TO DRIVE OR TO LEAVE THE HOSPITAL ALONE AFTER SURGERY.   It is strongly recommended that you arrange for someone to remain with you for the first 24 hrs following your surgery.    The Surgeon will speak to your family/visitor after your surgery regarding the outcome of your surgery and post op care.  The Surgeon may speak to you after your surgery, but there is a possibility you may not remember the details.  Please check with your family members regarding the conversation with the Surgeon.    We strongly recommend whoever is bringing you home be present for discharge instructions.  This will ensure a thorough understanding for your post op home care.    EACH PATIENT IS ALLOWED TWO FAMILY MEMBERS OR VISITORS IN THE ROOM AND IN THE WAITING ROOMS WHILE YOU ARE IN SURGERY. ALL CHILDREN MUST ALWAYS BE ACCOMPANIED BY AN ADULT.    Thank you for your cooperation.  The Staff of [facility].                Bathing Instructions with Hibiclens     Shower the evening before and morning of your procedure with Hibiclens:   Wash your face with water and your regular face wash/soap   Apply Hibiclens directly on your skin or on a wet washcloth and wash gently. When showering: Move away from the shower stream when  applying Hibiclens to avoid rinsing off too soon.   Rinse thoroughly with warm water   Do not dilute Hibiclens         Dry off as usual, do not use any deodorant, powder, body lotions, perfume, after shave or cologne.

## 2020-01-28 ENCOUNTER — HOSPITAL ENCOUNTER (OUTPATIENT)
Facility: OTHER | Age: 65
LOS: 1 days | Discharge: HOME HEALTH | End: 2020-01-29
Attending: ORTHOPAEDIC SURGERY | Admitting: ORTHOPAEDIC SURGERY
Payer: COMMERCIAL

## 2020-01-28 ENCOUNTER — ANESTHESIA (OUTPATIENT)
Dept: SURGERY | Facility: OTHER | Age: 65
End: 2020-01-28
Payer: COMMERCIAL

## 2020-01-28 DIAGNOSIS — M17.11 PRIMARY LOCALIZED OSTEOARTHROSIS OF THE KNEE, RIGHT: ICD-10-CM

## 2020-01-28 DIAGNOSIS — M17.11 PRIMARY LOCALIZED OSTEOARTHRITIS OF RIGHT KNEE: Primary | ICD-10-CM

## 2020-01-28 PROCEDURE — 37000008 HC ANESTHESIA 1ST 15 MINUTES: Performed by: ORTHOPAEDIC SURGERY

## 2020-01-28 PROCEDURE — 71000033 HC RECOVERY, INTIAL HOUR: Performed by: ORTHOPAEDIC SURGERY

## 2020-01-28 PROCEDURE — 63600175 PHARM REV CODE 636 W HCPCS

## 2020-01-28 PROCEDURE — 97116 GAIT TRAINING THERAPY: CPT

## 2020-01-28 PROCEDURE — 25000003 PHARM REV CODE 250: Performed by: ORTHOPAEDIC SURGERY

## 2020-01-28 PROCEDURE — 25000003 PHARM REV CODE 250: Performed by: NURSE PRACTITIONER

## 2020-01-28 PROCEDURE — 63600175 PHARM REV CODE 636 W HCPCS: Performed by: ANESTHESIOLOGY

## 2020-01-28 PROCEDURE — 94799 UNLISTED PULMONARY SVC/PX: CPT

## 2020-01-28 PROCEDURE — 27201423 OPTIME MED/SURG SUP & DEVICES STERILE SUPPLY: Performed by: ORTHOPAEDIC SURGERY

## 2020-01-28 PROCEDURE — C1776 JOINT DEVICE (IMPLANTABLE): HCPCS | Performed by: ORTHOPAEDIC SURGERY

## 2020-01-28 PROCEDURE — C9290 INJ, BUPIVACAINE LIPOSOME: HCPCS | Performed by: ORTHOPAEDIC SURGERY

## 2020-01-28 PROCEDURE — C1713 ANCHOR/SCREW BN/BN,TIS/BN: HCPCS | Performed by: ORTHOPAEDIC SURGERY

## 2020-01-28 PROCEDURE — 94761 N-INVAS EAR/PLS OXIMETRY MLT: CPT

## 2020-01-28 PROCEDURE — 63600175 PHARM REV CODE 636 W HCPCS: Performed by: NURSE ANESTHETIST, CERTIFIED REGISTERED

## 2020-01-28 PROCEDURE — 97110 THERAPEUTIC EXERCISES: CPT

## 2020-01-28 PROCEDURE — 63600175 PHARM REV CODE 636 W HCPCS: Performed by: SPECIALIST

## 2020-01-28 PROCEDURE — 63600175 PHARM REV CODE 636 W HCPCS: Performed by: ORTHOPAEDIC SURGERY

## 2020-01-28 PROCEDURE — 36000711: Performed by: ORTHOPAEDIC SURGERY

## 2020-01-28 PROCEDURE — 25000003 PHARM REV CODE 250: Performed by: SPECIALIST

## 2020-01-28 PROCEDURE — 71000039 HC RECOVERY, EACH ADD'L HOUR: Performed by: ORTHOPAEDIC SURGERY

## 2020-01-28 PROCEDURE — 97161 PT EVAL LOW COMPLEX 20 MIN: CPT

## 2020-01-28 PROCEDURE — 37000009 HC ANESTHESIA EA ADD 15 MINS: Performed by: ORTHOPAEDIC SURGERY

## 2020-01-28 PROCEDURE — 36000710: Performed by: ORTHOPAEDIC SURGERY

## 2020-01-28 DEVICE — CEMENT REFOBACIN BCR 1X40: Type: IMPLANTABLE DEVICE | Site: KNEE | Status: FUNCTIONAL

## 2020-01-28 RX ORDER — MEPERIDINE HYDROCHLORIDE 25 MG/ML
12.5 INJECTION INTRAMUSCULAR; INTRAVENOUS; SUBCUTANEOUS ONCE AS NEEDED
Status: DISCONTINUED | OUTPATIENT
Start: 2020-01-28 | End: 2020-01-28 | Stop reason: HOSPADM

## 2020-01-28 RX ORDER — SODIUM CHLORIDE, SODIUM LACTATE, POTASSIUM CHLORIDE, CALCIUM CHLORIDE 600; 310; 30; 20 MG/100ML; MG/100ML; MG/100ML; MG/100ML
INJECTION, SOLUTION INTRAVENOUS CONTINUOUS
Status: DISCONTINUED | OUTPATIENT
Start: 2020-01-28 | End: 2020-01-28

## 2020-01-28 RX ORDER — OXYCODONE HYDROCHLORIDE 5 MG/1
5 TABLET ORAL EVERY 4 HOURS PRN
Status: DISCONTINUED | OUTPATIENT
Start: 2020-01-28 | End: 2020-01-29 | Stop reason: HOSPADM

## 2020-01-28 RX ORDER — MUPIROCIN 20 MG/G
1 OINTMENT TOPICAL 2 TIMES DAILY
Status: DISCONTINUED | OUTPATIENT
Start: 2020-01-28 | End: 2020-01-29 | Stop reason: HOSPADM

## 2020-01-28 RX ORDER — SODIUM CHLORIDE 0.9 % (FLUSH) 0.9 %
3 SYRINGE (ML) INJECTION
Status: DISCONTINUED | OUTPATIENT
Start: 2020-01-28 | End: 2020-01-28

## 2020-01-28 RX ORDER — ONDANSETRON 2 MG/ML
INJECTION INTRAMUSCULAR; INTRAVENOUS
Status: DISCONTINUED | OUTPATIENT
Start: 2020-01-28 | End: 2020-01-28

## 2020-01-28 RX ORDER — DOCUSATE SODIUM 100 MG/1
100 CAPSULE, LIQUID FILLED ORAL EVERY 12 HOURS
Status: DISCONTINUED | OUTPATIENT
Start: 2020-01-28 | End: 2020-01-29 | Stop reason: HOSPADM

## 2020-01-28 RX ORDER — DIPHENHYDRAMINE HYDROCHLORIDE 50 MG/ML
25 INJECTION INTRAMUSCULAR; INTRAVENOUS EVERY 6 HOURS PRN
Status: DISCONTINUED | OUTPATIENT
Start: 2020-01-28 | End: 2020-01-28 | Stop reason: HOSPADM

## 2020-01-28 RX ORDER — ROPIVACAINE HYDROCHLORIDE 5 MG/ML
INJECTION, SOLUTION EPIDURAL; INFILTRATION; PERINEURAL
Status: COMPLETED | OUTPATIENT
Start: 2020-01-28 | End: 2020-01-28

## 2020-01-28 RX ORDER — BACITRACIN 50000 [IU]/1
INJECTION, POWDER, FOR SOLUTION INTRAMUSCULAR
Status: DISCONTINUED | OUTPATIENT
Start: 2020-01-28 | End: 2020-01-28 | Stop reason: HOSPADM

## 2020-01-28 RX ORDER — BUPIVACAINE HYDROCHLORIDE AND EPINEPHRINE 2.5; 5 MG/ML; UG/ML
INJECTION, SOLUTION EPIDURAL; INFILTRATION; INTRACAUDAL; PERINEURAL
Status: DISCONTINUED | OUTPATIENT
Start: 2020-01-28 | End: 2020-01-28 | Stop reason: HOSPADM

## 2020-01-28 RX ORDER — FAMOTIDINE 20 MG/1
20 TABLET, FILM COATED ORAL
Status: COMPLETED | OUTPATIENT
Start: 2020-01-28 | End: 2020-01-28

## 2020-01-28 RX ORDER — OXYCODONE HYDROCHLORIDE 5 MG/1
10 TABLET ORAL EVERY 4 HOURS PRN
Status: DISCONTINUED | OUTPATIENT
Start: 2020-01-28 | End: 2020-01-29 | Stop reason: HOSPADM

## 2020-01-28 RX ORDER — PREGABALIN 75 MG/1
75 CAPSULE ORAL
Status: COMPLETED | OUTPATIENT
Start: 2020-01-28 | End: 2020-01-28

## 2020-01-28 RX ORDER — CEFAZOLIN SODIUM 2 G/50ML
2 SOLUTION INTRAVENOUS
Status: COMPLETED | OUTPATIENT
Start: 2020-01-28 | End: 2020-01-29

## 2020-01-28 RX ORDER — CEFAZOLIN SODIUM 1 G/3ML
2 INJECTION, POWDER, FOR SOLUTION INTRAMUSCULAR; INTRAVENOUS
Status: DISCONTINUED | OUTPATIENT
Start: 2020-01-28 | End: 2020-01-28

## 2020-01-28 RX ORDER — ONDANSETRON 2 MG/ML
8 INJECTION INTRAMUSCULAR; INTRAVENOUS EVERY 8 HOURS PRN
Status: DISCONTINUED | OUTPATIENT
Start: 2020-01-28 | End: 2020-01-29 | Stop reason: HOSPADM

## 2020-01-28 RX ORDER — SODIUM CHLORIDE, SODIUM LACTATE, POTASSIUM CHLORIDE, CALCIUM CHLORIDE 600; 310; 30; 20 MG/100ML; MG/100ML; MG/100ML; MG/100ML
INJECTION, SOLUTION INTRAVENOUS CONTINUOUS
Status: DISCONTINUED | OUTPATIENT
Start: 2020-01-28 | End: 2020-01-29 | Stop reason: HOSPADM

## 2020-01-28 RX ORDER — BRIMONIDINE TARTRATE 1.5 MG/ML
1 SOLUTION/ DROPS OPHTHALMIC 2 TIMES DAILY
Status: DISCONTINUED | OUTPATIENT
Start: 2020-01-28 | End: 2020-01-29 | Stop reason: HOSPADM

## 2020-01-28 RX ORDER — FAMOTIDINE 20 MG/1
20 TABLET, FILM COATED ORAL 2 TIMES DAILY
Status: DISCONTINUED | OUTPATIENT
Start: 2020-01-28 | End: 2020-01-29 | Stop reason: HOSPADM

## 2020-01-28 RX ORDER — KETOROLAC TROMETHAMINE 30 MG/ML
INJECTION, SOLUTION INTRAMUSCULAR; INTRAVENOUS
Status: DISCONTINUED | OUTPATIENT
Start: 2020-01-28 | End: 2020-01-28 | Stop reason: HOSPADM

## 2020-01-28 RX ORDER — ACETAMINOPHEN 500 MG
1000 TABLET ORAL
Status: COMPLETED | OUTPATIENT
Start: 2020-01-28 | End: 2020-01-28

## 2020-01-28 RX ORDER — TRANEXAMIC ACID 100 MG/ML
INJECTION, SOLUTION INTRAVENOUS
Status: DISCONTINUED | OUTPATIENT
Start: 2020-01-28 | End: 2020-01-28 | Stop reason: HOSPADM

## 2020-01-28 RX ORDER — MIDAZOLAM HYDROCHLORIDE 1 MG/ML
INJECTION INTRAMUSCULAR; INTRAVENOUS
Status: DISCONTINUED | OUTPATIENT
Start: 2020-01-28 | End: 2020-01-28

## 2020-01-28 RX ORDER — ONDANSETRON 2 MG/ML
4 INJECTION INTRAMUSCULAR; INTRAVENOUS DAILY PRN
Status: DISCONTINUED | OUTPATIENT
Start: 2020-01-28 | End: 2020-01-28 | Stop reason: HOSPADM

## 2020-01-28 RX ORDER — HYDROMORPHONE HYDROCHLORIDE 2 MG/ML
0.4 INJECTION, SOLUTION INTRAMUSCULAR; INTRAVENOUS; SUBCUTANEOUS EVERY 5 MIN PRN
Status: DISCONTINUED | OUTPATIENT
Start: 2020-01-28 | End: 2020-01-28 | Stop reason: HOSPADM

## 2020-01-28 RX ORDER — LOSARTAN POTASSIUM 50 MG/1
50 TABLET ORAL DAILY
Status: DISCONTINUED | OUTPATIENT
Start: 2020-01-28 | End: 2020-01-29 | Stop reason: HOSPADM

## 2020-01-28 RX ORDER — ZOLPIDEM TARTRATE 5 MG/1
5 TABLET ORAL NIGHTLY PRN
Status: DISCONTINUED | OUTPATIENT
Start: 2020-01-28 | End: 2020-01-29 | Stop reason: HOSPADM

## 2020-01-28 RX ORDER — CELECOXIB 200 MG/1
400 CAPSULE ORAL
Status: COMPLETED | OUTPATIENT
Start: 2020-01-28 | End: 2020-01-28

## 2020-01-28 RX ORDER — OXYCODONE HYDROCHLORIDE 5 MG/1
5 TABLET ORAL
Status: DISCONTINUED | OUTPATIENT
Start: 2020-01-28 | End: 2020-01-28 | Stop reason: HOSPADM

## 2020-01-28 RX ORDER — LIDOCAINE HYDROCHLORIDE 10 MG/ML
0.5 INJECTION, SOLUTION EPIDURAL; INFILTRATION; INTRACAUDAL; PERINEURAL ONCE
Status: DISCONTINUED | OUTPATIENT
Start: 2020-01-28 | End: 2020-01-28 | Stop reason: HOSPADM

## 2020-01-28 RX ORDER — ENOXAPARIN SODIUM 100 MG/ML
40 INJECTION SUBCUTANEOUS EVERY 24 HOURS
Status: DISCONTINUED | OUTPATIENT
Start: 2020-01-29 | End: 2020-01-29 | Stop reason: HOSPADM

## 2020-01-28 RX ORDER — SODIUM CHLORIDE 0.9 % (FLUSH) 0.9 %
10 SYRINGE (ML) INJECTION
Status: DISCONTINUED | OUTPATIENT
Start: 2020-01-28 | End: 2020-01-29 | Stop reason: HOSPADM

## 2020-01-28 RX ORDER — PROPOFOL 10 MG/ML
VIAL (ML) INTRAVENOUS CONTINUOUS PRN
Status: DISCONTINUED | OUTPATIENT
Start: 2020-01-28 | End: 2020-01-28

## 2020-01-28 RX ADMIN — MUPIROCIN 1 G: 20 OINTMENT TOPICAL at 09:01

## 2020-01-28 RX ADMIN — DOCUSATE SODIUM 100 MG: 100 CAPSULE, LIQUID FILLED ORAL at 09:01

## 2020-01-28 RX ADMIN — CEFAZOLIN SODIUM 2 G: 2 SOLUTION INTRAVENOUS at 06:01

## 2020-01-28 RX ADMIN — FAMOTIDINE 20 MG: 20 TABLET, FILM COATED ORAL at 09:01

## 2020-01-28 RX ADMIN — BRIMONIDINE TARTRATE 1 DROP: 1.5 SOLUTION OPHTHALMIC at 09:01

## 2020-01-28 RX ADMIN — SODIUM CHLORIDE 2 G: 9 INJECTION, SOLUTION INTRAVENOUS at 11:01

## 2020-01-28 RX ADMIN — OXYCODONE HYDROCHLORIDE 5 MG: 5 TABLET ORAL at 11:01

## 2020-01-28 RX ADMIN — SODIUM CHLORIDE, SODIUM LACTATE, POTASSIUM CHLORIDE, AND CALCIUM CHLORIDE: 600; 310; 30; 20 INJECTION, SOLUTION INTRAVENOUS at 10:01

## 2020-01-28 RX ADMIN — MIDAZOLAM HYDROCHLORIDE 1 MG: 1 INJECTION, SOLUTION INTRAMUSCULAR; INTRAVENOUS at 11:01

## 2020-01-28 RX ADMIN — MIDAZOLAM HYDROCHLORIDE 2 MG: 1 INJECTION, SOLUTION INTRAMUSCULAR; INTRAVENOUS at 11:01

## 2020-01-28 RX ADMIN — SODIUM CHLORIDE, SODIUM LACTATE, POTASSIUM CHLORIDE, AND CALCIUM CHLORIDE: .6; .31; .03; .02 INJECTION, SOLUTION INTRAVENOUS at 04:01

## 2020-01-28 RX ADMIN — PROPOFOL 50 MCG/KG/MIN: 10 INJECTION, EMULSION INTRAVENOUS at 11:01

## 2020-01-28 RX ADMIN — OXYCODONE HYDROCHLORIDE 5 MG: 5 TABLET ORAL at 04:01

## 2020-01-28 RX ADMIN — ACETAMINOPHEN 1000 MG: 500 TABLET, FILM COATED ORAL at 09:01

## 2020-01-28 RX ADMIN — PREGABALIN 75 MG: 75 CAPSULE ORAL at 09:01

## 2020-01-28 RX ADMIN — ROPIVACAINE HYDROCHLORIDE 3 ML: 5 INJECTION, SOLUTION EPIDURAL; INFILTRATION; PERINEURAL at 11:01

## 2020-01-28 RX ADMIN — BIMATOPROST 1 DROP: 0.1 SOLUTION/ DROPS OPHTHALMIC at 09:01

## 2020-01-28 RX ADMIN — CELECOXIB 400 MG: 200 CAPSULE ORAL at 09:01

## 2020-01-28 RX ADMIN — FAMOTIDINE 20 MG: 20 TABLET ORAL at 09:01

## 2020-01-28 RX ADMIN — ONDANSETRON 4 MG: 2 INJECTION INTRAMUSCULAR; INTRAVENOUS at 11:01

## 2020-01-28 NOTE — ANESTHESIA POSTPROCEDURE EVALUATION
Anesthesia Post Evaluation    Patient: Ana Maria Daniels    Procedure(s) Performed: Procedure(s) (LRB):  ARTHROPLASTY, KNEE, TOTAL (Right)    Final Anesthesia Type: general    Patient location during evaluation: PACU  Patient participation: Yes- Able to Participate  Level of consciousness: awake and alert  Post-procedure vital signs: reviewed and stable  Pain management: adequate  Airway patency: patent    PONV status at discharge: No PONV  Anesthetic complications: no      Cardiovascular status: blood pressure returned to baseline and stable  Respiratory status: unassisted, spontaneous ventilation and room air  Hydration status: euvolemic  Follow-up not needed.          Vitals Value Taken Time   /75 1/28/2020  2:03 PM   Temp 36.4 °C (97.5 °F) 1/28/2020  1:15 PM   Pulse 58 1/28/2020  2:11 PM   Resp 18 1/28/2020  2:00 PM   SpO2 95 % 1/28/2020  2:11 PM   Vitals shown include unvalidated device data.      No case tracking events are documented in the log.      Pain/Marisa Score: Pain Rating Prior to Med Admin: 0 (1/28/2020  9:03 AM)  Marisa Score: 7 (1/28/2020  1:45 PM)

## 2020-01-28 NOTE — PLAN OF CARE
PT eval.  Pt performed TKR ex, ROM and amb with RW.  Anticipate dc home tomorrow.  REC: home with HH  No DME needs

## 2020-01-28 NOTE — CONSULTS
Consult Note  MED    Consult Requested By: Galindo Cuenca MD  Reason for Consult: HTN/OA/Glaucoma    SUBJECTIVE:     History of Present Illness:  Patient is a 64 y.o. female presents with scheduled right knee repair.  Pt known to us from previous left knee in 2017.  Seen Pre-op in [facility].  VSS.  Pre-op/EPIC reviewed.  Discussed with anesthesia.  No CP/SOB/N/V/D/F/C.       Past Medical History:   Diagnosis Date    Arthritis     Bleeding ulcer     Hypertension      Past Surgical History:   Procedure Laterality Date    CHOLECYSTECTOMY      EYE SURGERY Bilateral     IOL     History reviewed. No pertinent family history.  Social History     Tobacco Use    Smoking status: Never Smoker    Smokeless tobacco: Never Used   Substance Use Topics    Alcohol use: Yes     Alcohol/week: 2.0 standard drinks     Types: 1 Glasses of wine, 1 Shots of liquor per week     Comment: weekends    Drug use: No       Review of patient's allergies indicates:  No Known Allergies     Review of Systems:  Constitutional: No fever or chills  Respiratory: No cough or shortness of breath  Cardiovascular: No chest pain or palpitations  Gastrointestinal: No nausea or vomiting  Neurological: No confusion or weakness    OBJECTIVE:     Vital Signs (Most Recent)  Temp: 97.7 °F (36.5 °C) (01/28/20 0902)  Pulse: 72 (01/28/20 0902)  Resp: 16 (01/28/20 0902)  BP: 118/72 (01/28/20 0902)  SpO2: 96 % (01/28/20 0902)    Vital Signs Range (Last 24H):  Temp:  [97.7 °F (36.5 °C)]   Pulse:  [72]   Resp:  [16]   BP: (118)/(72)   SpO2:  [96 %]     No intake or output data in the 24 hours ending 01/28/20 8262    Physical Exam:  General appearance: Well developed, well nourished  Eyes:  Conjunctivae/corneas clear. PERRL.  Lungs: Normal respiratory effort,   clear to auscultation bilaterally   Heart: Regular rate and rhythm, S1, S2 normal, no murmur, rub or carli.  Abdomen: Soft, non-tender non-distended; bowel sounds normal; no masses,  no  organomegaly  Extremities: No cyanosis or clubbing. No edema.    Skin: Skin color, texture, turgor normal. No rashes or lesions  Neurologic: Normal strength and tone. No focal numbness or weakness   Right knee marked for OR      Laboratory:  No results for input(s): WBC, RBC, HGB, HCT, PLT, MCV, MCH, MCHC in the last 24 hours.  BMP:   Diagnostic Results:  No orders to display       ASSESSMENT/PLAN:     S/p R TKA  - Post-op management, pain control, and DVT prophylaxis per Dr. Cuenca.     HTN (I 10)  - Resumed Losartan.  Hold parameters ordered.  - Hold HCTZ and restart at DC.     Glaucoma (H40.9)  - Resumed eye drops.    PCP: Dr. Olga Gil    Thanks for consult  See above  Will follow along.

## 2020-01-28 NOTE — OR NURSING
"The patient tolerated recovery without any distress noted. The patient is lying on the bed AAOx4, Afebrile, Sats 96-98% on RA, pain reassessed on a scale of 0-10, and the patient stated "0". We are awaiting transport to transfer the patient to room 364.   "

## 2020-01-28 NOTE — PLAN OF CARE
SW met with pt at bedside to complete discharge assessment, verified PCP and uses [facility] and would like bedside delivery.  Pt has POA and LW but not on file at hospital.  Pt has RW, BSC, straight cane.  SW gave list of  agencies and would like [facility] and signed choice form.  Spouse will drive pt home.     01/28/20 7660   Discharge Assessment   Assessment Type Discharge Planning Assessment   Confirmed/corrected address and phone number on facesheet? Yes   Assessment information obtained from? Patient   Communicated expected length of stay with patient/caregiver no   Prior to hospitilization cognitive status: Alert/Oriented   Prior to hospitalization functional status: Independent   Current cognitive status: Alert/Oriented   Current Functional Status: Assistive Equipment;Needs Assistance   Lives With spouse   Able to Return to Prior Arrangements yes   Is patient able to care for self after discharge? Unable to determine at this time (comments)   Readmission Within the Last 30 Days no previous admission in last 30 days   Patient currently being followed by outpatient case management? No   Patient currently receives any other outside agency services? No   Equipment Currently Used at Home bedside commode;cane, straight;walker, rolling   Do you have any problems affording any of your prescribed medications? No   Is the patient taking medications as prescribed? yes   Does the patient have transportation home? Yes   Transportation Anticipated family or friend will provide   Does the patient receive services at the Coumadin Clinic? No   Discharge Plan A Home Health   DME Needed Upon Discharge  none   Patient/Family in Agreement with Plan yes

## 2020-01-28 NOTE — OP NOTE
[facility]  Surgery Department  Operative Note    SUMMARY     Patient: Ana Maria Daniels    Medical Record: [id number]    Date of Procedure: 1/28/2020     Surgeon: Surgeon(s) and Role:     * Galindo Cuenca MD - Primary    Assisting Surgeon: None    Pre-Operative Diagnosis: Primary localized osteoarthrosis of the knee, right [M17.11]    Post-Operative Diagnosis: Post-Op Diagnosis Codes:     * Primary localized osteoarthrosis of the knee, right [M17.11]    Procedure: Procedure(s) (LRB):  ARTHROPLASTY, KNEE, TOTAL (Right) LIMA components    Anesthesia:  Spinal    EBL:  None    Drains:  None    Complications:  None    Indication:  Patient is a 64-year-old female with a long history of right knee pain and osteoarthritis.  She had bone-on-bone in the weight-bearing compartments.  She was unable to walk more than half block without severe pain.  Patient admitted for right total knee arthroplasty.  Patient understood the options of treatment with risks benefits and limitations of operative versus non operative treatment and gave informed consent for operative intervention.    Findings:  Right knee examination showed varus alignment with tricompartmental changes.  Surgical findings showed significant bone-on-bone in the weight-bearing compartments and patellofemoral joint region.  The patella tracked laterally with subluxation.  After placement of the prosthetic components we used a cemented 6 femoral component along with a cemented 5 tibial component and  a 12 mm polyethylene insert.  We used a 29 all poly patella which was cemented into place.  After adequate setup time for the cement the knee had full range of motion excellent alignment stability in both AP and lateral planes and excellent patellar tracking after lateral release was performed. No other pathology noted.    Procedure in Detail:  After operative consents were obtained patient brought to the operative room laid in a supine position after  spinal epidural anesthesia was administered via the anesthesia department patient was placed in a well-padded operating table all bony prominences were well padded.  A tourniquet was placed on the right thigh and a post was placed on the operative table to allow knee flexion during the procedure all bony prominences were well padded patient did receive preoperative antibiotics prior to tourniquet elevation.  The right knee and leg were then prepped and draped in usual sterile fashion.  After exsanguination of the limb the tourniquet was inflated to 350 mm of mercury.  A standard midline incision was made over the right knee dissection carried down to the extensor mechanism. A medial parapatellar arthrotomy was then made.  The retropatellar tendon fat pad was excised as well as the medial lateral meniscal cartilages.  Gaining access into the intramedullary canal of the femur the distal femoral alignment jig was applied with a 3 degree valgus cut reference.  The distal femoral cut was then made.  Referencing off the distal femur the sizing jig was applied to accept a 6 cutting block.  The cutting block was applied in the anterior-posterior femoral cuts were made followed by the anterior-posterior chamfer cuts.  The proximal tibia was then exposed and using the extramedullary tibial alignment guide in the appropriate varus valgus and posterior slope the proximal tibia was resected salvaging the PCL.  The sizing jig was applied to the tibia to accept a 5 tray and the cruciform stem was cut in the appropriate rotation. A trial reduction was then carried out with a 6 femur 5 tibia and a 10 and 12 insert. The 12 was most stable.  There was excellent alignment stability in both AP and lateral planes.  The patella was then resurfaced removing 9 mm of bone and cartilage and a 293 prong patella was drilled followed by the trial recreating the thickness of the patella. We did perform a lateral release with the electrocautery to  allow optimal patella tracking. After this was completed the trial components were removed and the bone edges were irrigated with pulse jet lavage sterile saline with antibiotic solution.  The [employer] tissue ablation instrument was also used for tissue hemostasis at that time.  The permanent components were opened on the back table 2 bags of Palacos cement were mixed and applied to the tibia and the 5 tibial component was cemented into place followed by the cemented 6 femoral component. The 12 mm polyethylene insert was placed along with a locking mechanism and then the knee brought to full extension further compressing the cement. The 29 all poly patella was cemented into place held with a patellar clamp.  After adequate setup time for the cement the knee had full range of motion excellent alignment stability in both AP and lateral planes excellent patellar tracking. The knee was again irrigated with pulse jet lavage sterile saline with antibiotic solution.  The Exparel cocktail was then injected into the capsule and subcutaneous tissue for local anesthesia and then 3 vials of tranexamic acid were drizzled into the capsule and soft tissue for tissue hemostasis.  The medial arthrotomy was then closed at the VMO with interrupted 0 Ethibond suture x4 and the rest the arthrotomy was closed with a running 2.  Quill suture.  The subcu was closed in layers with 0 and 2 0 Vicryl suture in the skin closed with metal staples.  Sterile dressings were applied consisting of Xeroform gauze 4x4s and cast padding along with a polar Care unit and an Ace bandage from toe to groin tourniquet deflated after an hour and 10 min tourniquet time.  Patient tolerated the procedure well was sent to recovery room stable condition needle and sponge counts were correct blood loss minimal no blood given no drains placed.

## 2020-01-28 NOTE — NURSING
Pt arrived to floor via stretcher with AFSANEH Sarmiento and transferred to bed. VSS. IVF started, TEDs/ SCDs applied, oriented to room, call light placed within reach, bed low and locked, and family at bedside. IS encouraged. Pt complains of pain  1/10. R knee dressing, CDI. BLLE pulses intact. Mast noted draining clear yellow urine to gravity. No acute distress noted at this time. Will continue to monitor.

## 2020-01-28 NOTE — ANESTHESIA PROCEDURE NOTES
Spinal    Diagnosis: arthritis  Patient location during procedure: holding area  Start time: 1/28/2020 11:20 AM  Timeout: 1/28/2020 11:19 AM  End time: 1/28/2020 11:26 AM    Staffing  Authorizing Provider: Munira Lepe MD  Performing Provider: Munira Lepe MD    Preanesthetic Checklist  Completed: patient identified, site marked, surgical consent, pre-op evaluation, timeout performed, IV checked, risks and benefits discussed and monitors and equipment checked  Spinal Block  Patient position: sitting  Prep: ChloraPrep  Patient monitoring: heart rate, cardiac monitor, continuous pulse ox and frequent blood pressure checks  Approach: midline  Location: L2-3  Injection technique: single shot  CSF Fluid: clear free-flowing CSF  Needle  Needle type: pencil-tip   Needle gauge: 25 G  Needle length: 3.5 in  Additional Documentation: incremental injection, negative aspiration for heme and no paresthesia on injection  Needle localization: anatomical landmarks  Assessment  Ease of block: easy  Patient's tolerance of the procedure: comfortable throughout block and no complaints  Additional Notes  rop lot [id number]  Exp 7/23

## 2020-01-28 NOTE — TRANSFER OF CARE
Anesthesia Transfer of Care Note    Patient: Ana Maria Daniels    Procedure(s) Performed: Procedure(s) (LRB):  ARTHROPLASTY, KNEE, TOTAL (Right)    Patient location: PACU    Anesthesia Type: spinal    Transport from OR: Transported from OR on room air with adequate spontaneous ventilation    Post pain: adequate analgesia    Post assessment: no apparent anesthetic complications    Post vital signs: stable    Level of consciousness: awake    Nausea/Vomiting: no nausea/vomiting    Complications: none    Transfer of care protocol was followed      Last vitals:   Visit Vitals  /72 (BP Location: Right arm, Patient Position: Lying)   Pulse 72   Temp 36.5 °C (97.7 °F) (Oral)   Resp 16   Wt 122.5 kg (270 lb 1 oz)   SpO2 96%   Breastfeeding? No   BMI 41.06 kg/m²

## 2020-01-29 VITALS
WEIGHT: 270.06 LBS | HEART RATE: 85 BPM | TEMPERATURE: 99 F | DIASTOLIC BLOOD PRESSURE: 63 MMHG | HEIGHT: 68 IN | SYSTOLIC BLOOD PRESSURE: 134 MMHG | OXYGEN SATURATION: 96 % | RESPIRATION RATE: 18 BRPM | BODY MASS INDEX: 40.93 KG/M2

## 2020-01-29 LAB
ERYTHROCYTE [DISTWIDTH] IN BLOOD BY AUTOMATED COUNT: 14.2 % (ref 11.5–14.5)
HCT VFR BLD AUTO: 39.4 % (ref 37–48.5)
HGB BLD-MCNC: 12.4 G/DL (ref 12–16)
MCH RBC QN AUTO: 28.1 PG (ref 27–31)
MCHC RBC AUTO-ENTMCNC: 31.5 G/DL (ref 32–36)
MCV RBC AUTO: 89 FL (ref 82–98)
PLATELET # BLD AUTO: 204 K/UL (ref 150–350)
PMV BLD AUTO: 11.7 FL (ref 9.2–12.9)
RBC # BLD AUTO: 4.42 M/UL (ref 4–5.4)
WBC # BLD AUTO: 8.79 K/UL (ref 3.9–12.7)

## 2020-01-29 PROCEDURE — 97535 SELF CARE MNGMENT TRAINING: CPT

## 2020-01-29 PROCEDURE — 25000003 PHARM REV CODE 250: Performed by: ORTHOPAEDIC SURGERY

## 2020-01-29 PROCEDURE — 63600175 PHARM REV CODE 636 W HCPCS: Performed by: ORTHOPAEDIC SURGERY

## 2020-01-29 PROCEDURE — 36415 COLL VENOUS BLD VENIPUNCTURE: CPT

## 2020-01-29 PROCEDURE — 85027 COMPLETE CBC AUTOMATED: CPT

## 2020-01-29 PROCEDURE — 97165 OT EVAL LOW COMPLEX 30 MIN: CPT

## 2020-01-29 PROCEDURE — 97110 THERAPEUTIC EXERCISES: CPT | Mod: CQ

## 2020-01-29 PROCEDURE — 25000003 PHARM REV CODE 250: Performed by: NURSE PRACTITIONER

## 2020-01-29 PROCEDURE — 97116 GAIT TRAINING THERAPY: CPT | Mod: CQ

## 2020-01-29 RX ORDER — ASPIRIN 81 MG/1
81 TABLET ORAL 2 TIMES DAILY
Qty: 28 TABLET | Refills: 0 | Status: SHIPPED | OUTPATIENT
Start: 2020-01-29 | End: 2020-02-12

## 2020-01-29 RX ORDER — HYDROCODONE BITARTRATE AND ACETAMINOPHEN 7.5; 325 MG/1; MG/1
1 TABLET ORAL EVERY 6 HOURS PRN
Qty: 40 TABLET | Refills: 0 | Status: SHIPPED | OUTPATIENT
Start: 2020-01-29

## 2020-01-29 RX ADMIN — DOCUSATE SODIUM 100 MG: 100 CAPSULE, LIQUID FILLED ORAL at 09:01

## 2020-01-29 RX ADMIN — CEFAZOLIN SODIUM 2 G: 2 SOLUTION INTRAVENOUS at 03:01

## 2020-01-29 RX ADMIN — ENOXAPARIN SODIUM 40 MG: 100 INJECTION SUBCUTANEOUS at 06:01

## 2020-01-29 RX ADMIN — MUPIROCIN 1 G: 20 OINTMENT TOPICAL at 09:01

## 2020-01-29 RX ADMIN — BRIMONIDINE TARTRATE 1 DROP: 1.5 SOLUTION OPHTHALMIC at 09:01

## 2020-01-29 RX ADMIN — FAMOTIDINE 20 MG: 20 TABLET ORAL at 09:01

## 2020-01-29 RX ADMIN — OXYCODONE HYDROCHLORIDE 5 MG: 5 TABLET ORAL at 06:01

## 2020-01-29 RX ADMIN — OXYCODONE HYDROCHLORIDE 10 MG: 5 TABLET ORAL at 10:01

## 2020-01-29 RX ADMIN — SODIUM CHLORIDE, SODIUM LACTATE, POTASSIUM CHLORIDE, AND CALCIUM CHLORIDE: .6; .31; .03; .02 INJECTION, SOLUTION INTRAVENOUS at 12:01

## 2020-01-29 NOTE — PLAN OF CARE
10:27 AM  In agreement and eager for DC.  Post operative RLE limb neurovascularly intact.  HH and DME arranged per CMGT.  Walker has arrived to room and [facility] and [facility] care packed for pt.  VU of DC instructions, paperwork and prescriptions passed. IV removed with cath tip intact, WNL.   To be DC home with family.  Will be escorted downstairs via  transport team once dressed and ready.  Free from falls or skin breakdown this hospital admission.       11:03 AM  WALKER FOR HOME USE - [facility]: Routine, Status: Sent   Ordered 01/29/20 0809   Task [facility] HME INFORMATION   Scheduled 01/29/20 0810   Status Open       3 IN 1 COMMODE FOR HOME USE - [facility]: Routine, Status: Sent   Ordered 01/29/20 0809   Task [facility] HME INFORMATION   Scheduled 01/29/20 0810   Status Open       TRANSFER TUB BENCH FOR HOME USE - [facility]: Routine, Status: Sent   Ordered 01/29/20 0809   Task [facility] HME INFORMATION   Scheduled 01/29/20 0810   Status Open     Not necessary to wait for the above DME, all available at home.    Extra TEDs Provided fgor home use

## 2020-01-29 NOTE — DISCHARGE INSTRUCTIONS
Knee Athroscopy Discharge Instructions    1) Pain: After surgery your knee will be sore. The knee will likely have been injected with a numbing medicine (Exparel) prior to completion of surgery for pain control. This is indicated on a green bracelet that you will continue to wear for 4 days after surgery. You will   also get a prescription for pain control before you leave the hospital. Ice and elevation will assist with pain control.    a) Apply ice as much as possible for the first 72 hours. After 72 hours, apply ice for 20minutes 3-4 times a day, after therapy,after exercising or whenever experiencing pain. Avoid direct skin contact with ice to prevent frostbit.          b) Elevate the affected leg with the pillow the length of the leg  to assist with swelling and pain.  2) Incision Care:  a) Some drainage from the incision in the first 72 hours is normal. If drainage is excessive,remove bandage,  pat dry, cover with sterile gauze and secure with tape. Notify physician about excessive drainage. Staples will be removed 14 days after surgery   3) Activity:  a) Perform exercises 2-3 x day.  b) You may shower 48 hours after surgery providing the dressing is waterproof. No tub or hot tub usage.. Support help is mandatory during showering. If the dressing becomes wet, replace with a new dressing.   c) Wear thigh high kameron hose stockings for 3 weeks after surgery .You may remove stockings for 1- 2 hours during the day only. Send patient home with an extra pair kameron hose.If your physician orders the CPM machine you are to use it for 2 hours in the am and 2 hours in the pm.Increase the flexion 5 degrees each session if tolerated. This is not to replace your exercise program.  4) For lifetime after your replacement surgery, you may need antibiotic coverage before dental or minor surgical procedures.  5) Possible Complications: Call Surgeon  a) Infection: Report these signs and symptoms to your surgeon.  i) Unexpected redness  around incision   ii) Persistent drainage from wound after 72 hours.  iii) Temperature ,can be treated with Tylenol. Do not go to the emergency room or urgent care center, call your surgeon.   iv) Additional swelling  v) Pain not controlled with current pain medication  b) Blood Clot: Report theses signs and symptoms to your surgeon  i) Unusual pain  ii) Red or discolored skin  iii) Swelling in the leg  iv) Unusual warm skin      Aspirin, ASA oral tablets  What is this medicine?  ASPIRIN (AS pir in) is a pain reliever. It is used to treat mild pain and fever. This medicine is also used as directed by a doctor to prevent and to treat heart attacks, to prevent strokes, and to treat arthritis or inflammation.  How should I use this medicine?  Take this medicine by mouth with a glass of water. Follow the directions on the package or prescription label. You can take this medicine with or without food. If it upsets your stomach, take it with food. Do not take your medicine more often than directed.  Talk to your pediatrician regarding the use of this medicine in children. While this drug may be prescribed for children as young as 12 years of age for selected conditions, precautions do apply. Children and teenagers should not use this medicine to treat chicken pox or flu symptoms unless directed by a doctor.  Patients over 65 years old may have a stronger reaction and need a smaller dose.  What side effects may I notice from receiving this medicine?    Side effects that you should report to your doctor or health care professional as soon as possible:  · allergic reactions like skin rash, itching or hives, swelling of the face, lips, or tongue  · breathing problems  · changes in hearing, ringing in the ears  · confusion  · general ill feeling or flu-like symptoms  · pain on swallowing  · redness, blistering, peeling or loosening of the skin, including inside the mouth or nose  · signs and symptoms of bleeding such as bloody  or black, tarry stools; red or dark-brown urine; spitting up blood or brown material that looks like coffee grounds; red spots on the skin; unusual bruising or bleeding from the eye, gums, or nose  · trouble passing urine or change in the amount of urine  · unusually weak or tired  · yellowing of the eyes or skin    Side effects that usually do not require medical attention (report to your doctor or health care professional if they continue or are bothersome):  · diarrhea or constipation  · headache  · nausea, vomiting  · stomach gas, heartburn    What may interact with this medicine?  Do not take this medicine with any of the following medications:  · cidofovir  · ketorolac  · probenecid  This medicine may also interact with the following medications:  · alcohol  · alendronate  · bismuth subsalicylate  · flavocoxid  · herbal supplements like feverfew, garlic, gi, ginkgo biloba, horse chestnut  · medicines for diabetes or glaucoma like acetazolamide, methazolamide  · medicines for gout  · medicines that treat or prevent blood clots like enoxaparin, heparin, ticlopidine, warfarin  · other aspirin and aspirin-like medicines  · NSAIDs, medicines for pain and inflammation, like ibuprofen or naproxen  · pemetrexed  · sulfinpyrazone  · varicella live vaccine  What if I miss a dose?  If you are taking this medicine on a regular schedule and miss a dose, take it as soon as you can. If it is almost time for your next dose, take only that dose. Do not take double or extra doses.  Where should I keep my medicine?  Keep out of the reach of children.  Store at room temperature between 15 and 30 degrees C (59 and 86 degrees F). Protect from heat and moisture. Do not use this medicine if it has a strong vinegar smell. Throw away any unused medicine after the expiration date.  What should I tell my health care provider before I take this medicine?  They need to know if you have any of these  conditions:  · anemia  · asthma  · bleeding problems  · child with chickenpox, the flu, or other viral infection  · diabetes  · gout  · if you frequently drink alcohol containing drinks  · kidney disease  · liver disease  · low level of vitamin K  · lupus  · smoke tobacco  · stomach ulcers or other problems  · an unusual or allergic reaction to aspirin, tartrazine dye, other medicines, dyes, or preservatives  · pregnant or trying to get pregnant  · breast-feeding  What should I watch for while using this medicine?  If you are treating yourself for pain, tell your doctor or health care professional if the pain lasts more than 10 days, if it gets worse, or if there is a new or different kind of pain. Tell your doctor if you see redness or swelling. Also, check with your doctor if you have a fever that lasts for more than 3 days. Only take this medicine to prevent heart attacks or blood clotting if prescribed by your doctor or health care professional.  Do not take aspirin or aspirin-like medicines with this medicine. Too much aspirin can be dangerous. Always read the labels carefully.  This medicine can irritate your stomach or cause bleeding problems. Do not smoke cigarettes or drink alcohol while taking this medicine. Do not lie down for 30 minutes after taking this medicine to prevent irritation to your throat.  If you are scheduled for any medical or dental procedure, tell your healthcare provider that you are taking this medicine. You may need to stop taking this medicine before the procedure.  This medicine may be used to treat migraines. If you take migraine medicines for 10 or more days a month, your migraines may get worse. Keep a diary of headache days and medicine use. Contact your healthcare professional if your migraine attacks occur more frequently.  NOTE:This sheet is a summary. It may not cover all possible information. If you have questions about this medicine, talk to your doctor, pharmacist, or  health care provider. Copyright© 2017 [employer]        Acetaminophen; Hydrocodone tablets or capsules  What is this medicine?  ACETAMINOPHEN; HYDROCODONE (a set a MAVERICK jonathan fen; zohra droe KOE done) is a pain reliever. It is used to treat moderate to severe pain.  How should I use this medicine?  Take this medicine by mouth with a glass of water. Follow the directions on the prescription label. You can take it with or without food. If it upsets your stomach, take it with food. Do not take your medicine more often than directed.  A special MedGuide will be given to you by the pharmacist with each prescription and refill. Be sure to read this information carefully each time.  Talk to your pediatrician regarding the use of this medicine in children. Special care may be needed.  What side effects may I notice from receiving this medicine?    Side effects that you should report to your doctor or health care professional as soon as possible:    · allergic reactions like skin rash, itching or hives, swelling of the face, lips, or tongue  · breathing problems  · confusion  · redness, blistering, peeling or loosening of the skin, including inside the mouth  · signs and symptoms of low blood pressure like dizziness; feeling faint or lightheaded, falls; unusually weak or tired  · trouble passing urine or change in the amount of urine  · yellowing of the eyes or skin    Side effects that usually do not require medical attention (report to your doctor or health care professional if they continue or are bothersome):    · constipation  · dry mouth  · nausea, vomiting  · Tiredness    What may interact with this medicine?  This medicine may interact with the following medications:  · alcohol  · antiviral medicines for HIV or AIDS  · atropine  · antihistamines for allergy, cough and cold  · certain antibiotics like erythromycin, clarithromycin  · certain medicines for anxiety or sleep  · certain medicines for bladder problems like  oxybutynin, tolterodine  · certain medicines for depression like amitriptyline, fluoxetine, sertraline  · certain medicines for fungal infections like ketoconazole and itraconazole  · certain medicines for Parkinson's disease like benztropine, trihexyphenidyl  · certain medicines for seizures like carbamazepine, phenobarbital, phenytoin, primidone  · certain medicines for stomach problems like dicyclomine, hyoscyamine  · certain medicines for travel sickness like scopolamine  · general anesthetics like halothane, isoflurane, methoxyflurane, propofol  · ipratropium  · local anesthetics like lidocaine, pramoxine, tetracaine  · MAOIs like Carbex, Eldepryl, Marplan, Nardil, and Parnate  · medicines that relax muscles for surgery  · other medicines with acetaminophen  · other narcotic medicines for pain or cough  · phenothiazines like chlorpromazine, mesoridazine, prochlorperazine, thioridazine  · rifampin  What if I miss a dose?  If you miss a dose, take it as soon as you can. If it is almost time for your next dose, take only that dose. Do not take double or extra doses.  Where should I keep my medicine?  Keep out of the reach of children. This medicine can be abused. Keep your medicine in a safe place to protect it from theft. Do not share this medicine with anyone. Selling or giving away this medicine is dangerous and against the law.  This medicine may cause accidental overdose and death if it taken by other adults, children, or pets. Mix any unused medicine with a substance like cat litter or coffee grounds. Then throw the medicine away in a sealed container like a sealed bag or a coffee can with a lid. Do not use the medicine after the expiration date.  Store at room temperature between 15 and 30 degrees C (59 and 86 degrees F).  What should I tell my health care provider before I take this medicine?  They need to know if you have any of these conditions:  · brain tumor  · Crohn's disease, inflammatory bowel  disease, or ulcerative colitis  · drug abuse or addiction  · head injury  · heart or circulation problems  · if you often drink alcohol  · kidney disease or problems going to the bathroom  · liver disease  · lung disease, asthma, or breathing problems  · an unusual or allergic reaction to acetaminophen, hydrocodone, other opioid analgesics, other medicines, foods, dyes, or preservatives  · pregnant or trying to get pregnant  · breast-feeding  What should I watch for while using this medicine?  Tell your doctor or health care professional if your pain does not go away, if it gets worse, or if you have new or a different type of pain. You may develop tolerance to the medicine. Tolerance means that you will need a higher dose of the medicine for pain relief. Tolerance is normal and is expected if you take the medicine for a long time.  Do not suddenly stop taking your medicine because you may develop a severe reaction. Your body becomes used to the medicine. This does NOT mean you are addicted. Addiction is a behavior related to getting and using a drug for a non-medical reason. If you have pain, you have a medical reason to take pain medicine. Your doctor will tell you how much medicine to take. If your doctor wants you to stop the medicine, the dose will be slowly lowered over time to avoid any side effects.  There are different types of narcotic medicines (opiates). If you take more than one type at the same time or if you are taking another medicine that also causes drowsiness, you may have more side effects. Give your health care provider a list of all medicines you use. Your doctor will tell you how much medicine to take. Do not take more medicine than directed. Call emergency for help if you have problems breathing or unusual sleepiness.  Do not take other medicines that contain acetaminophen with this medicine. Always read labels carefully. If you have questions, ask your doctor or pharmacist.  If you take too  much acetaminophen get medical help right away. Too much acetaminophen can be very dangerous and cause liver damage. Even if you do not have symptoms, it is important to get help right away.  You may get drowsy or dizzy. Do not drive, use machinery, or do anything that needs mental alertness until you know how this medicine affects you. Do not stand or sit up quickly, especially if you are an older patient. This reduces the risk of dizzy or fainting spells. Alcohol may interfere with the effect of this medicine. Avoid alcoholic drinks.  The medicine will cause constipation. Try to have a bowel movement at least every 2 to 3 days. If you do not have a bowel movement for 3 days, call your doctor or health care professional.  Your mouth may get dry. Chewing sugarless gum or sucking hard candy, and drinking plenty of water may help. Contact your doctor if the problem does not go away or is severe.  NOTE:This sheet is a summary. It may not cover all possible information. If you have questions about this medicine, talk to your doctor, pharmacist, or health care provider. Copyright© 2017 [employer]

## 2020-01-29 NOTE — PROGRESS NOTES
"MED  Progress Note    Admit Date: 1/28/2020   LOS: 1 day     SUBJECTIVE:     Follow-up For:  Primary localized osteoarthrosis of the knee, right    Interval History:     Restless night with pain. Currently doing better on CPM.  Discussed with Dr. Cuenca at bedside.  No chest pain, shortness of breath, calf tenderness.    Review of Systems:  Constitutional: No fever or chills  Respiratory: No cough or shortness of breath  Cardiovascular: No chest pain or palpitations  Gastrointestinal: No nausea or vomiting  Neurological: No confusion or weakness    OBJECTIVE:     Vital Signs Range (Last 24H):  /63   Pulse 85   Temp 98.5 °F (36.9 °C)   Resp 18   Ht 5' 8" (1.727 m)   Wt 122.5 kg (270 lb 1 oz)   SpO2 96%   Breastfeeding? No   BMI 41.06 kg/m²     Temp:  [97.5 °F (36.4 °C)-98.5 °F (36.9 °C)]   Pulse:  [59-85]   Resp:  [12-20]   BP: (119-156)/(57-75)   SpO2:  [95 %-100 %]     I & O (Last 24H):    Intake/Output Summary (Last 24 hours) at 1/29/2020 0907  Last data filed at 1/29/2020 0700  Gross per 24 hour   Intake 3880 ml   Output 2025 ml   Net 1855 ml       Physical Exam:  General appearance: Well developed, well nourished  Eyes:  Conjunctivae/corneas clear. PERRL.  Lungs: Normal respiratory effort,   clear to auscultation bilaterally   Heart: Regular rate and rhythm, S1, S2 normal, no murmur, rub or carli.  Abdomen: Soft, non-tender non-distended; bowel sounds normal; no masses,  no organomegaly, obese  Extremities: No cyanosis or clubbing. No edema.    Skin: Skin color, texture, turgor normal. No rashes or lesions  Neurologic: Normal strength and tone. No focal numbness or weakness  Right knee with minimal drainage on dressing.        Laboratory Data:  Recent Labs   Lab 01/29/20  0405   WBC 8.79   RBC 4.42   HGB 12.4   HCT 39.4      MCV 89   MCH 28.1   MCHC 31.5*         No results found for: URICACID    BNP  No results for input(s): BNP, BNPTRIAGEBLO in the last 168 " hours.    Medications:  Medication list was reviewed and changes noted under Assessment/Plan.    Diagnostic Results:        ASSESSMENT/PLAN:          S/p R TKA  - Post-op management, pain control, and DVT prophylaxis per Dr. Cuenca.     HTN (I 10)  - Resumed Losartan.  Hold parameters ordered.  - Hold HCTZ and [facility] at [facility].     Glaucoma (H40.9)  - Resumed eye drops.    History of GI bleed from NSAIDs:  -discussed with Dr. Cuenca  -[facility] with aspirin at home    PCP: Dr. Olga Velarde to [facility]

## 2020-01-29 NOTE — PROGRESS NOTES
KAITLYNN f/u with [facility] (formerly [facility] care), spoke to Annelise, [phone], doesn't accept [employer].      KAITLYNN met with pt and informed of not accepted by [facility], pt will to be placed with Dr. Cuenca's preferred provider.    KAITLYNN faxed  orders and medical records to [facility].

## 2020-01-29 NOTE — PT/OT/SLP EVAL
Physical Therapy Evaluation and treatment      Patient Name:  Ana Maria Daniels   MRN:  [id number]    Recommendations:     Discharge Recommendations:  home health PT   Discharge Equipment Recommendations: none(has all needed DME)   Barriers to discharge: Inaccessible home and Decreased caregiver support(beginning next week)    Assessment:     Ana Maria Daniels is a 64 y.o. female admitted with a medical diagnosis of Primary localized osteoarthrosis of the knee, right.  She presents with the following impairments/functional limitations:  weakness, impaired endurance, impaired functional mobilty, gait instability, impaired balance, decreased lower extremity function, decreased ROM, pain.  pt had L TKR in 2017. Did well on initial session.  Anticipate dc tomorow    Rehab Prognosis: Good; patient would benefit from acute skilled PT services to address these deficits and reach maximum level of function.    Recent Surgery: Procedure(s) (LRB):  ARTHROPLASTY, KNEE, TOTAL (Right) Day of Surgery    Plan:     During this hospitalization, patient to be seen BID to address the identified rehab impairments via gait training, therapeutic activities, therapeutic exercises and progress toward the following goals:    · Plan of Care Expires:  02/27/20    Subjective     Chief Complaint: knee pain  Patient/Family Comments/goals: my leg wants to keep rolling out and its uncomfortable to try to straighten out.  Feels better after walking .  Goals-PLOF   Pain/Comfort:  · Pain Rating 1: 4/10  · Location - Side 1: Right  · Location - Orientation 1: generalized  · Location 1: knee  · Pain Addressed 1: Pre-medicate for activity, Reposition, Distraction, Nurse notified  · Pain Rating Post-Intervention 1: 3/10    Patients cultural, spiritual, Sikhism conflicts given the current situation: other (see comments)(none stated )    Living Environment:  Pt lives with spouse in 2 story house with 4 SHAUN and BHR.  Her bath and bedroom on 1st floor.  Shower  stall with built in seat and comfort height toilet.  Pt has special needs son that comes home every other weekend and will sleep in bed with parents.  Will be home Feb 7th.    Prior to admission, patients level of function was mod I for ADL and amb without AD, drives and does all iADL's. retired .  Equipment used at home: none.  DME owned (not currently used): rolling walker, single point cane and bedside commode.  Upon discharge, patient will have assistance from spouse this week.  He will return to work next Mon.    Objective:     Communicated with nurse prior to session.  Patient found HOB elevated with cryotherapy, day catheter, FCD, SCD, peripheral IV  upon PT entry to room.    General Precautions: Standard, fall   Orthopedic Precautions:RLE weight bearing as tolerated   Braces: N/A     Exams:  · Cognitive Exam:  Patient is oriented to Person, Place, Time and Situation  · Gross Motor Coordination:  decreased in timing  · Postural Exam:  Patient presented with the following abnormalities:    · -       Rounded shoulders  · Sensation:    · -       Intact  light/touch BLE  · Skin Integrity/Edema:      · -       Skin integrity: R LE in surgical bandage  · -       Edema: R knee  · RLE ROM: Deficits: decreased in knee-flex to ~ 70 degrees, ankle WFL  · RLE Strength: Deficits: fair quad contraction, assist to SLR, ankle WFL  · LLE ROM: WFL  · LLE Strength: WFL    Functional Mobility:  · Bed Mobility:     · Supine to Sit: minimum assistance  · Sit to Supine: n/a-left up in chair  · Transfers:     · Sit to Stand:  minimum assistance with rolling walker and cues for hand placement  · Gait: pt amb 125' with RW and CGA.  cues for reciprocal gait.  decreased chris and stride length  · Balance: fair standing      Therapeutic Activities and Exercises:   PT eval  Pt performed 1 set of 10 reps of RLE    2. Quad sets  3. Ankle pumps  4. Hip abd/add  5. SLR  6. Knee flexion (heel slides)  7. Short arc quads  Pt required  verbal cues, tactile cues and visual cues for technique in order to complete.   Gait as above      AM-PAC 6 CLICK MOBILITY  Total Score:17     Patient left up in chair with all lines intact, call button in reach, nurse notified and spouse present.    GOALS:   Multidisciplinary Problems     Physical Therapy Goals        Problem: Physical Therapy Goal    Goal Priority Disciplines Outcome Goal Variances Interventions   Physical Therapy Goal     PT, PT/OT Ongoing, Progressing     Description:  Goals to be met by: 2020     Patient will increase functional independence with mobility by performin. Supine to sit with supervision.   2. Sit to supine with supervision.   3. Sit<>stand transfer with supervision using rolling walker.   4. Gait > 150 feet with SBA using rolling walker.   5. Ascend/descend 4 stairs with B handrails with CGA                     History:     Past Medical History:   Diagnosis Date    Arthritis     Bleeding ulcer     Hypertension        Past Surgical History:   Procedure Laterality Date    CHOLECYSTECTOMY      EYE SURGERY Bilateral     IOL       Time Tracking:     PT Received On: 20  PT Start Time: 1557     PT Stop Time: 1653  PT Total Time (min): 56 min     Billable Minutes: Evaluation 15, Gait Training 16 and Therapeutic Exercise 25      Phuong Enriquez, PT  2020

## 2020-01-29 NOTE — PLAN OF CARE
Problem: Occupational Therapy Goal  Goal: Occupational Therapy Goal  Description  Goals to be met by: 1/29/20    Patient will increase functional independence with ADLs by performing:    Donning pants/underwear at Min A level using learned adaptive techniques and adhering to left TKA precautions.   Toilet transfers at CGA level adhering to left TKA precautions.   Toileting at CGA level adhering to left TKA precautions.   Grooming tasks standing at sink at SBA level.       Outcome: Met   Evaluation complete.  Goals established and met.  Recommend discharge to home with assist and Home Health OT and PT.  No further skilled OT needs in acute care setting.

## 2020-01-29 NOTE — DISCHARGE SUMMARY
[facility]  Discharge Summary      SUMMARY     Admit Date: 1/28/2020    Discharge Date and Time: No discharge date for patient encounter.    Hospital Course (synopsis of major diagnoses, care, treatment, and services provided during the course of the hospital stay):  Patient underwent right total knee arthroplasty on the above-noted date.  Postoperatively she did well.  She ambulated on postop day 1.  Quite well and was discharged home on postop day 1.  In good condition to have a regular diet.  Med reconciliation form completed.  Follow up Dr. Cuenca.  Two weeks.      Final Diagnosis: Post-Op Diagnosis Codes:     * Primary localized osteoarthrosis of the knee, right [M17.11]    Disposition: Home or Self Care    Follow Up/Patient Instructions:  2 weeks    Medications:  Reconciled Home Medications:      Medication List      START taking these medications    aspirin 81 MG EC tablet  Commonly known as:  ECOTRIN  Take 1 tablet (81 mg total) by mouth 2 (two) times daily. for 14 days     HYDROcodone-acetaminophen 7.5-325 mg per tablet  Commonly known as:  NORCO  Take 1 tablet by mouth every 6 (six) hours as needed for Pain.        CONTINUE taking these medications    bimatoprost 0.01 % Drop  Commonly known as:  LUMIGAN  Place 1 drop into both eyes every evening.     brimonidine 0.15 % OPTH DROP 0.15 % ophthalmic solution  Commonly known as:  ALPHAGAN  Place 1 drop into both eyes 2 (two) times daily.     Lactobacillus rhamnosus GG 10 billion cell capsule  Commonly known as:  CULTURELLE  Take 1 capsule by mouth once daily.     losartan-hydrochlorothiazide 50-12.5 mg 50-12.5 mg per tablet  Commonly known as:  HYZAAR  Take 1 tablet by mouth once daily.     ONE-A-DAY WOMEN'S 50 PLUS ORAL  Take 1 tablet by mouth once daily.     Vitamin C 1000 MG tablet  Generic drug:  ascorbic acid (vitamin C)  Take 1,000 mg by mouth once daily.          Discharge Procedure Orders   WALKER FOR HOME USE     Order Specific  "Question Answer Comments   Type of Walker: Adult (5'4"-6'6")    With wheels? Yes    Height: 5' 8" (1.727 m)    Weight: 122.5 kg (270 lb 1 oz)    Does patient have medical equipment at home? bedside commode    Does patient have medical equipment at home? cane, straight    Does patient have medical equipment at home? walker, rolling    Length of need (1-99 months): 99      3 IN 1 COMMODE FOR HOME USE     Order Specific Question Answer Comments   Type: Standard    Height: 5' 8" (1.727 m)    Weight: 122.5 kg (270 lb 1 oz)    Does patient have medical equipment at home? bedside commode    Does patient have medical equipment at home? cane, straight    Does patient have medical equipment at home? walker, rolling    Length of need (1-99 months): 99      TRANSFER TUB BENCH FOR HOME USE     Order Specific Question Answer Comments   Type of Transfer Tub Bench: Unpadded    Height: 5' 8" (1.727 m)    Weight: 122.5 kg (270 lb 1 oz)    Does patient have medical equipment at home? bedside commode    Does patient have medical equipment at home? cane, straight    Does patient have medical equipment at home? walker, rolling    Length of need (1-99 months): 99      Leave dressing on - Keep it clean, dry, and intact until clinic visit           "

## 2020-01-29 NOTE — PLAN OF CARE
SW accepted by [facility]     01/29/20 1007   Final Note   Assessment Type Final Discharge Note   Anticipated Discharge Disposition Home-Health   What phone number can be called within the next 1-3 days to see how you are doing after discharge?   ([phone])   Hospital Follow Up  Appt(s) scheduled? No   Discharge plans and expectations educations in teach back method with documentation complete? No

## 2020-01-29 NOTE — PT/OT/SLP PROGRESS
Physical Therapy Treatment    Patient Name:  Ana Maria Daniels   MRN:  [id number]    Recommendations:     Discharge Recommendations:  home health PT   Discharge Equipment Recommendations: none(has all needed DME)   Barriers to discharge: Inaccessible home and Decreased caregiver support (beginning next week)    Assessment:     Ana Maria Daniels is a 64 y.o. female admitted with a medical diagnosis of Primary localized osteoarthrosis of the knee, right.  She presents with the following impairments/functional limitations:  weakness, impaired endurance, impaired functional mobilty, gait instability, impaired balance, decreased lower extremity function, decreased ROM, pain progressing toward goals.  Anticipate dc tomorrow .    Rehab Prognosis: Good; patient would benefit from acute skilled PT services to address these deficits and reach maximum level of function.    Recent Surgery: Procedure(s) (LRB):  ARTHROPLASTY, KNEE, TOTAL (Right) Day of Surgery    Plan:     During this hospitalization, patient to be seen BID to address the identified rehab impairments via gait training, therapeutic activities, therapeutic exercises and progress toward the following goals:    · Plan of Care Expires:  02/27/20    Subjective     Chief Complaint: back of leg is tight  Patient/Family Comments/goals: my leg keeps wanting to roll out.  That roll in the chair worked good-can I put that in bed to hold it?  Pain/Comfort:  · Pain Rating 1: 2/10  · Location - Side 1: Right  · Location - Orientation 1: generalized  · Location 1: knee  · Pain Addressed 1: Pre-medicate for activity, Reposition, Distraction, Nurse notified  · Pain Rating Post-Intervention 1: 2/10      Objective:     Communicated with nurse prior to session.  Patient found up in chair with peripheral IV, cryotherapy, day catheter upon PT entry to room.     General Precautions: Standard, fall   Orthopedic Precautions:RLE weight bearing as tolerated   Braces: N/A     Functional  Mobility:  · Bed Mobility:     · Supine to Sit: up in chair  · Sit to Supine: minimum assistance  · Transfers:     · Sit to Stand:  contact guard assistance with rolling walker and cues for hand placement  · Gait: pt amb 175' with RW and CGA.  decreased chris, reciprocal giat but slight unequal step length  · Balance: fair standing      AM-PAC 6 CLICK MOBILITY  Turning over in bed (including adjusting bedclothes, sheets and blankets)?: 3  Sitting down on and standing up from a chair with arms (e.g., wheelchair, bedside commode, etc.): 3  Moving from lying on back to sitting on the side of the bed?: 3  Moving to and from a bed to a chair (including a wheelchair)?: 3  Need to walk in hospital room?: 3  Climbing 3-5 steps with a railing?: 2  Basic Mobility Total Score: 17       Therapeutic Activities and Exercises:   gait as above  Pt performed 1 set of 10 reps of RLE  Sitting in chair  Hip flex  Long arc quads  In supine after gait   2. Quad sets  3. Ankle pumps  4. Hip abd/add  5. SLR with assist  6. Knee flexion (heel slides)  7. Short arc quads  Pt required verbal cues, tactile cues and visual cues for technique in order to complete.     Pt placed on CPM from -5 to 45 degrees.  Given hand control.  Will get nurse to take off in 2 hours and place pillow under sheet alongside R leg to prevent it from ext rotating in bed         Patient left HOB elevated with all lines intact, call button in reach, spouse notified and nursing present..    GOALS:   Multidisciplinary Problems     Physical Therapy Goals        Problem: Physical Therapy Goal    Goal Priority Disciplines Outcome Goal Variances Interventions   Physical Therapy Goal     PT, PT/OT Ongoing, Progressing     Description:  Goals to be met by: 2020     Patient will increase functional independence with mobility by performin. Supine to sit with supervision.   2. Sit to supine with supervision.   3. Sit<>stand transfer with supervision using rolling  walker.   4. Gait > 150 feet with SBA using rolling walker.   5. Ascend/descend 4 stairs with B handrails with CGA                     Time Tracking:     PT Received On: 01/28/20  PT Start Time: 1840     PT Stop Time: 1921  PT Total Time (min): 41 min     Billable Minutes: Gait Training 16 and Therapeutic Exercise 25    Treatment Type: Treatment  PT/PTA: PT     PTA Visit Number: 0     Phuong Enriquez, PT  01/28/2020

## 2020-01-29 NOTE — PT/OT/SLP EVAL
Occupational Therapy   Evaluation, Progress Note and Discharge    Name: Ana Maria Daniels  MRN: [id number]  Admitting Diagnosis:  Primary localized osteoarthrosis of the knee, right 1 Day Post-Op    Recommendations:     Discharge Recommendations: home health PT, home health OT  Discharge Equipment Recommendations:  none  Barriers to discharge:  None    Assessment:     Ana Maria Daniels is a 64 y.o. female with a medical diagnosis of Primary localized osteoarthrosis of the knee, right.  She presents agreeable to OT evaluation and tx session.  Pt's  present for all education/training.  Pt is Set up level for UB self care tasks, SBA for standing at sink for grooming tasks, CGA for toileting and toilet transfers using RW,  Min A for donning pants/underwear and Mod A for donning slip on shoes.  Pt has needed DME from previous surgeries and demonstrates good safety awareness.      Performance deficits affecting function: impaired endurance, impaired self care skills, impaired functional mobilty, gait instability, impaired balance, decreased lower extremity function, pain, decreased ROM, impaired joint extensibility, orthopedic precautions.  Recommend discharge to home with assist from  and Home Health OT and PT.     Rehab Prognosis: Good; patient would benefit from acute skilled OT services to address these deficits and reach maximum level of function.       Plan:     Patient to be seen (Pt discharging to home today. Recommend HH OT.) to address the above listed problems via self-care/home management  · Plan of Care Expires: 02/28/20  · Plan of Care Reviewed with: patient, spouse    Subjective     Chief Complaint: Posterior right knee pain.  Patient/Family Comments/goals: Pt is wanting to return to home.     Occupational Profile:  Living Environment: Lives with  in 2 story home, 4 SHAUN, bilateral hand rail, walk-in shower with built-in seat  Previous level of function: Mod I/Indep  Roles and Routines: pt  has an adult son with disabilities that comes home some weekends to stay with pt and   Equipment Used at Home:  bedside commode, walker, rolling(Built-in shower seat and hand held shower)  Assistance upon Discharge:     Pain/Comfort:  · Pain Rating 1: 5/10  · Location - Side 1: Right  · Location - Orientation 1: generalized  · Location 1: knee  · Pain Addressed 1: Pre-medicate for activity, Reposition, Distraction, Cessation of Activity(Cryotherapy)  · Pain Rating Post-Intervention 1: 5/10    Patients cultural, spiritual, [employer] conflicts given the current situation: (None stated)    Objective:     Communicated with: nurse prior to session.  Patient found HOB elevated with cryotherapy, peripheral IV upon OT entry to room.    General Precautions: Standard, fall   Orthopedic Precautions:RLE weight bearing as tolerated   Braces: N/A     Occupational Performance:    Bed Mobility:    · Patient completed Supine to Sit with minimum assistance for moving right LE only    Functional Mobility/Transfers:  · Patient completed Sit <> Stand Transfer with contact guard assistance  with  rolling walker   · Patient completed Chair Transfer using Step Transfer technique with contact guard assistance with rolling walker  · Patient completed Toilet Transfer Step Transfer technique with contact guard assistance with  rolling walker  · Functional Mobility: Pt cued for upright posture in standing and during ambulation, exhaling during exertion, pushing through UEs on RW when weight bearing through right LE and stepping with left LE, and hand placement during sit<>stand.    Activities of Daily Living:  · Grooming: stand by assistance standing at sink  · Upper Body Dressing: set up  sitting EOB  · Lower Body Dressing: Min A for donning elastic waist pants Mod A for donning slip-on shoes  · Toileting: contact guard assistance with cues for safety techniques.    Cognitive/Visual Perceptual:  Intact cognition; Pt wears  glasses and uses eye drops    Physical Exam:  Skin integrity: Surgical incision with surgical dressing of right knee and edema; wears compression stockings and using cryotherapy.  Motor Planning:  Intact  Upper Extremity Strength:WFL for self care tasks  Fine Motor Coordination: Intact  Gross motor coordination: Intact    AMPAC 6 Click ADL:  AMPAC Total Score: 17    Treatment & Education:  Role of OT, POC, right TKA precautions, adaptive techniques for LB self care tasks, safety strategies to reduce fall risk during ADLs/ADL mobility, use of 3 in 1 commode in shower if difficulty with sit<>stand from built-in shower seat  Education:    Patient left up in chair with all lines intact, call button in reach, nurse notified and  present    GOALS:   Multidisciplinary Problems     Occupational Therapy Goals     Not on file          Multidisciplinary Problems (Resolved)        Problem: Occupational Therapy Goal    Goal Priority Disciplines Outcome Interventions   Occupational Therapy Goal   (Resolved)     OT, PT/OT Met    Description:  Goals to be met by: 1/29/20    Patient will increase functional independence with ADLs by performing:    Donning pants/underwear at Min A level using learned adaptive techniques and adhering to left TKA precautions.   Toilet transfers at CGA level adhering to left TKA precautions.   Toileting at CGA level adhering to left TKA precautions.   Grooming tasks standing at sink at SBA level.                        History:     Past Medical History:   Diagnosis Date    Arthritis     Bleeding ulcer     Hypertension        Past Surgical History:   Procedure Laterality Date    CHOLECYSTECTOMY      EYE SURGERY Bilateral     IOL       Time Tracking:     OT Date of Treatment: 01/29/20  OT Start Time: 0850  OT Stop Time: 0930  OT Total Time (min): 40 min    Billable Minutes:Evaluation 15  Self Care/Home Management 25    CHANA Timmons  1/29/2020

## 2020-01-29 NOTE — PLAN OF CARE
Plan of care reviewed with patient. Pt remains free from fall, injury, and skin breakdown. Positions self independently. Pt pain controlled with current pain regimen. Incision to Rt knee CDI with polar care in place.  Purposeful hourly rounding done. Safety maintained. Patient lying in bed in no distress. Will continue to monitor.

## 2020-01-29 NOTE — PROGRESS NOTES
Patient postop day 1.  From right total knee arthroplasty.  Overall she is doing well with mild discomfort.  Right knee dressing intact with recent dressing change.  Neurovascular status stable.  Plan:  Continue physical therapy this morning and then discharge home.            Home Health arranged for gait training and range of motion to right knee.            Low-dose aspirin regimen discussed for DVT prophylaxis.

## 2020-12-28 ENCOUNTER — TELEPHONE (OUTPATIENT)
Dept: INTERNAL MEDICINE | Facility: CLINIC | Age: 65
End: 2020-12-28

## 2020-12-28 ENCOUNTER — CLINICAL SUPPORT (OUTPATIENT)
Dept: URGENT CARE | Facility: CLINIC | Age: 65
End: 2020-12-28
Payer: COMMERCIAL

## 2020-12-28 VITALS — HEART RATE: 104 BPM | TEMPERATURE: 97 F | OXYGEN SATURATION: 96 %

## 2020-12-28 DIAGNOSIS — U07.1 COVID-19 VIRUS DETECTED: ICD-10-CM

## 2020-12-28 DIAGNOSIS — Z20.822 EXPOSURE TO COVID-19 VIRUS: Primary | ICD-10-CM

## 2020-12-28 DIAGNOSIS — Z03.818 ENCOUNTER FOR OBSERVATION FOR SUSPECTED EXPOSURE TO OTHER BIOLOGICAL AGENTS RULED OUT: Primary | ICD-10-CM

## 2020-12-28 LAB
CTP QC/QA: YES
SARS-COV-2 RDRP RESP QL NAA+PROBE: POSITIVE

## 2020-12-28 PROCEDURE — 99199 CV19 SPECIMEN HANDLING FEE / SWAB: ICD-10-PCS | Mod: S$GLB,,, | Performed by: PHYSICIAN ASSISTANT

## 2020-12-28 PROCEDURE — U0002 COVID-19 LAB TEST NON-CDC: HCPCS | Mod: QW,S$GLB,, | Performed by: PHYSICIAN ASSISTANT

## 2020-12-28 PROCEDURE — 99199 UNLISTED SPECIAL SVC PX/RPRT: CPT | Mod: S$GLB,,, | Performed by: PHYSICIAN ASSISTANT

## 2020-12-28 PROCEDURE — U0002: ICD-10-PCS | Mod: QW,S$GLB,, | Performed by: PHYSICIAN ASSISTANT

## 2020-12-28 NOTE — PROGRESS NOTES
Patient presents to the clinic with COVID concerns, patient was given the option to see a provider.  Patient is symptomatic and elects to not see a provider, they were given a handout which recognizes their decision to not see the provider today, as well as recommendations to follow up with their PCP.  If their symptoms worsen, they will need to follow up with their PCP, any urgent care clinic, or ER for further evaluation.     Patient's temperature, O2 sats, and pulse were taken for this visit.   Vitals:    12/28/20 1402   Pulse: 104   Temp: 97.3 °F (36.3 °C)         They were within normal limits.   If not with in normal, they were advised that they should see the provider for evaluation.  However, they adamantly refused despite provider's encouragement.

## 2022-02-15 ENCOUNTER — CLINICAL SUPPORT (OUTPATIENT)
Dept: OTHER | Facility: CLINIC | Age: 67
End: 2022-02-15
Payer: COMMERCIAL

## 2022-02-15 DIAGNOSIS — Z00.8 ENCOUNTER FOR OTHER GENERAL EXAMINATION: ICD-10-CM

## 2022-02-16 VITALS — BODY MASS INDEX: 39.88 KG/M2 | HEIGHT: 69 IN

## 2022-02-16 LAB
HDLC SERPL-MCNC: 54 MG/DL
POC CHOLESTEROL, LDL (DOCK): 156 MG/DL
POC CHOLESTEROL, TOTAL: 244 MG/DL
POC GLUCOSE, FASTING: 103 MG/DL (ref 60–110)
TRIGL SERPL-MCNC: 170 MG/DL

## 2023-02-15 DIAGNOSIS — Z71.9 HEALTH EDUCATION/COUNSELING: Primary | ICD-10-CM

## 2023-09-19 NOTE — H&P
H&P  has been reviewed.  The patient has been examined, I concur with the findings and no changes have occurred since H&P was written.   Resolved after discontinuation of HCTZ

## 2024-01-16 DIAGNOSIS — Z71.9 HEALTH EDUCATION/COUNSELING: Primary | ICD-10-CM

## (undated) DEVICE — UNDERGLOVES BIOGEL PI SIZE 7.5

## (undated) DEVICE — SEE MEDLINE ITEM 152523

## (undated) DEVICE — SHIELD FACE HALF DISP 50/BX

## (undated) DEVICE — MIXER BONE CEMENT

## (undated) DEVICE — DRESSING N ADH OIL EMUL 3X8 3S

## (undated) DEVICE — SEE MEDLINE ITEM 146298

## (undated) DEVICE — TOURNIQUET SB QC DP 34X4IN

## (undated) DEVICE — SUT VICRYL PLUS 0 CT1 36IN

## (undated) DEVICE — SYR 30CC LUER LOCK

## (undated) DEVICE — COVER BACK TABLE 72X21

## (undated) DEVICE — SEE MEDLINE ITEM 153151

## (undated) DEVICE — GLOVE BIOGEL SKINSENSE PI 8.0

## (undated) DEVICE — DRAPE STERI INSTRUMENT 1018

## (undated) DEVICE — ELECTRODE REM PLYHSV RETURN 9

## (undated) DEVICE — SEE MEDLINE ITEM 152548

## (undated) DEVICE — PADDING CAST SPECIALIST 6X4YD

## (undated) DEVICE — GAUZE SPONGE 4'X4 12 PLY

## (undated) DEVICE — PAD CAST SPECIALIST STRL 6

## (undated) DEVICE — SOL NACL 0.9% INJ 250ML BG

## (undated) DEVICE — BANDAGE ACE ELASTIC 6"

## (undated) DEVICE — UNDERGLOVES BIOGEL PI SIZE 8

## (undated) DEVICE — SAGITTAL BLADE 24.46MM

## (undated) DEVICE — NDL HYPO SAFETY 22 X 1 1/2

## (undated) DEVICE — GAUZE SPONGE 4X4 12PLY

## (undated) DEVICE — BLANKET HYPER ADULT 24X60IN

## (undated) DEVICE — SEE MEDLINE ITEM 146231

## (undated) DEVICE — SOL IRR NACL .9% 3000ML

## (undated) DEVICE — DRESSING XEROFORM FOIL PK 1X8

## (undated) DEVICE — STAPLER SKIN PROXIMATE WIDE

## (undated) DEVICE — PAD ABD 8X10 STERILE

## (undated) DEVICE — SUT STRATAFIX PDS 1 CTX 18IN

## (undated) DEVICE — SEALER BIPOLAR TISSUE 6.0

## (undated) DEVICE — Device

## (undated) DEVICE — SOL 9P NACL IRR PIC IL

## (undated) DEVICE — GLOVE BIOGEL SKINSENSE PI 7.0

## (undated) DEVICE — APPLICATOR CHLORAPREP ORN 26ML

## (undated) DEVICE — BLADE SURG CARBON STEEL #10

## (undated) DEVICE — GOWN B1 X-LG X-LONG

## (undated) DEVICE — PAD KNEE POLAR XL

## (undated) DEVICE — SUT ETHIBOND EXCEL 1 CTX 18

## (undated) DEVICE — SUT VICRYL PLUS 2-0 CT1 18

## (undated) DEVICE — SUT ETHIBOND 0 CR/CT-1 8-18

## (undated) DEVICE — DRESSING XEROFORM GAUZE 5X9

## (undated) DEVICE — NDL SAFETY 22G X 1.5 ECLIPSE

## (undated) DEVICE — SEE MEDLINE ITEM 146271

## (undated) DEVICE — SEE MEDLINE ITEM 146345

## (undated) DEVICE — KIT IRR SUCTION HND PIECE

## (undated) DEVICE — DRESSING GAUZE XEROFORM 5X9

## (undated) DEVICE — BLADE SAGITTAL 18 X 1.27 X 90M

## (undated) DEVICE — PULSAVAC ZIMMER